# Patient Record
Sex: FEMALE | Race: OTHER | HISPANIC OR LATINO | Employment: OTHER | URBAN - METROPOLITAN AREA
[De-identification: names, ages, dates, MRNs, and addresses within clinical notes are randomized per-mention and may not be internally consistent; named-entity substitution may affect disease eponyms.]

---

## 2019-12-16 ENCOUNTER — APPOINTMENT (EMERGENCY)
Dept: RADIOLOGY | Facility: HOSPITAL | Age: 74
DRG: 872 | End: 2019-12-16
Payer: MEDICARE

## 2019-12-16 ENCOUNTER — HOSPITAL ENCOUNTER (INPATIENT)
Facility: HOSPITAL | Age: 74
LOS: 2 days | Discharge: HOME | DRG: 872 | End: 2019-12-18
Attending: EMERGENCY MEDICINE | Admitting: STUDENT IN AN ORGANIZED HEALTH CARE EDUCATION/TRAINING PROGRAM
Payer: MEDICARE

## 2019-12-16 ENCOUNTER — APPOINTMENT (EMERGENCY)
Dept: CT IMAGING | Facility: HOSPITAL | Age: 74
DRG: 872 | End: 2019-12-16
Payer: MEDICARE

## 2019-12-16 DIAGNOSIS — N10 ACUTE PYELONEPHRITIS: Primary | ICD-10-CM

## 2019-12-16 DIAGNOSIS — R10.84 GENERALIZED ABDOMINAL PAIN: ICD-10-CM

## 2019-12-16 DIAGNOSIS — N12 PYELONEPHRITIS: ICD-10-CM

## 2019-12-16 PROBLEM — E87.6 HYPOKALEMIA: Status: ACTIVE | Noted: 2019-12-16

## 2019-12-16 PROBLEM — R19.7 NAUSEA, VOMITING AND DIARRHEA: Status: ACTIVE | Noted: 2019-12-16

## 2019-12-16 PROBLEM — E83.42 HYPOMAGNESEMIA: Status: ACTIVE | Noted: 2019-12-16

## 2019-12-16 PROBLEM — R73.9 HYPERGLYCEMIA: Status: ACTIVE | Noted: 2019-12-16

## 2019-12-16 PROBLEM — R11.2 NAUSEA, VOMITING AND DIARRHEA: Status: ACTIVE | Noted: 2019-12-16

## 2019-12-16 PROBLEM — E78.5 HYPERLIPIDEMIA: Status: ACTIVE | Noted: 2019-12-16

## 2019-12-16 LAB
ALBUMIN SERPL BCP-MCNC: 2.9 G/DL (ref 3.5–5)
ALP SERPL-CCNC: 90 U/L (ref 46–116)
ALT SERPL W P-5'-P-CCNC: 41 U/L (ref 12–78)
ANION GAP SERPL CALCULATED.3IONS-SCNC: 11 MMOL/L (ref 4–13)
ANION GAP SERPL CALCULATED.3IONS-SCNC: 13 MMOL/L (ref 4–13)
AST SERPL W P-5'-P-CCNC: 63 U/L (ref 5–45)
ATRIAL RATE: 87 BPM
BACTERIA UR QL AUTO: ABNORMAL /HPF
BASOPHILS # BLD AUTO: 0.01 THOUSANDS/ΜL (ref 0–0.1)
BASOPHILS NFR BLD AUTO: 0 % (ref 0–1)
BILIRUB SERPL-MCNC: 0.8 MG/DL (ref 0.2–1)
BILIRUB UR QL STRIP: NEGATIVE
BUN SERPL-MCNC: 15 MG/DL (ref 5–25)
BUN SERPL-MCNC: 21 MG/DL (ref 5–25)
CALCIUM SERPL-MCNC: 7.3 MG/DL (ref 8.3–10.1)
CALCIUM SERPL-MCNC: 7.7 MG/DL (ref 8.3–10.1)
CHLORIDE SERPL-SCNC: 109 MMOL/L (ref 100–108)
CHLORIDE SERPL-SCNC: 99 MMOL/L (ref 100–108)
CLARITY UR: ABNORMAL
CO2 SERPL-SCNC: 22 MMOL/L (ref 21–32)
CO2 SERPL-SCNC: 22 MMOL/L (ref 21–32)
COLOR UR: YELLOW
CREAT SERPL-MCNC: 1.03 MG/DL (ref 0.6–1.3)
CREAT SERPL-MCNC: 1.12 MG/DL (ref 0.6–1.3)
EOSINOPHIL # BLD AUTO: 0 THOUSAND/ΜL (ref 0–0.61)
EOSINOPHIL NFR BLD AUTO: 0 % (ref 0–6)
ERYTHROCYTE [DISTWIDTH] IN BLOOD BY AUTOMATED COUNT: 13.7 % (ref 11.6–15.1)
EST. AVERAGE GLUCOSE BLD GHB EST-MCNC: 128 MG/DL
FLUAV RNA NPH QL NAA+PROBE: NORMAL
FLUBV RNA NPH QL NAA+PROBE: NORMAL
GFR SERPL CREATININE-BSD FRML MDRD: 48 ML/MIN/1.73SQ M
GFR SERPL CREATININE-BSD FRML MDRD: 53 ML/MIN/1.73SQ M
GLUCOSE P FAST SERPL-MCNC: 182 MG/DL (ref 65–99)
GLUCOSE SERPL-MCNC: 131 MG/DL (ref 65–140)
GLUCOSE SERPL-MCNC: 182 MG/DL (ref 65–140)
GLUCOSE SERPL-MCNC: 196 MG/DL (ref 65–140)
GLUCOSE UR STRIP-MCNC: NEGATIVE MG/DL
HBA1C MFR BLD: 6.1 % (ref 4.2–6.3)
HCT VFR BLD AUTO: 35.7 % (ref 34.8–46.1)
HGB BLD-MCNC: 11.9 G/DL (ref 11.5–15.4)
HGB UR QL STRIP.AUTO: ABNORMAL
IMM GRANULOCYTES # BLD AUTO: 0.03 THOUSAND/UL (ref 0–0.2)
IMM GRANULOCYTES NFR BLD AUTO: 0 % (ref 0–2)
KETONES UR STRIP-MCNC: ABNORMAL MG/DL
LACTATE SERPL-SCNC: 1.6 MMOL/L (ref 0.5–2)
LEUKOCYTE ESTERASE UR QL STRIP: ABNORMAL
LIPASE SERPL-CCNC: 92 U/L (ref 73–393)
LYMPHOCYTES # BLD AUTO: 0.42 THOUSANDS/ΜL (ref 0.6–4.47)
LYMPHOCYTES NFR BLD AUTO: 5 % (ref 14–44)
MAGNESIUM SERPL-MCNC: 1.6 MG/DL (ref 1.6–2.6)
MAGNESIUM SERPL-MCNC: 3.4 MG/DL (ref 1.6–2.6)
MCH RBC QN AUTO: 31.2 PG (ref 26.8–34.3)
MCHC RBC AUTO-ENTMCNC: 33.3 G/DL (ref 31.4–37.4)
MCV RBC AUTO: 94 FL (ref 82–98)
MONOCYTES # BLD AUTO: 0.26 THOUSAND/ΜL (ref 0.17–1.22)
MONOCYTES NFR BLD AUTO: 3 % (ref 4–12)
NEUTROPHILS # BLD AUTO: 7.33 THOUSANDS/ΜL (ref 1.85–7.62)
NEUTS SEG NFR BLD AUTO: 92 % (ref 43–75)
NITRITE UR QL STRIP: POSITIVE
NON-SQ EPI CELLS URNS QL MICRO: ABNORMAL /HPF
NRBC BLD AUTO-RTO: 0 /100 WBCS
P AXIS: 35 DEGREES
PH UR STRIP.AUTO: 5.5 [PH]
PLATELET # BLD AUTO: 131 THOUSANDS/UL (ref 149–390)
PLATELET # BLD AUTO: 132 THOUSANDS/UL (ref 149–390)
PMV BLD AUTO: 11.5 FL (ref 8.9–12.7)
PMV BLD AUTO: 11.6 FL (ref 8.9–12.7)
POTASSIUM SERPL-SCNC: 2.6 MMOL/L (ref 3.5–5.3)
POTASSIUM SERPL-SCNC: 4 MMOL/L (ref 3.5–5.3)
PR INTERVAL: 144 MS
PROT SERPL-MCNC: 7 G/DL (ref 6.4–8.2)
PROT UR STRIP-MCNC: ABNORMAL MG/DL
QRS AXIS: -22 DEGREES
QRSD INTERVAL: 98 MS
QT INTERVAL: 382 MS
QTC INTERVAL: 459 MS
RBC # BLD AUTO: 3.81 MILLION/UL (ref 3.81–5.12)
RBC #/AREA URNS AUTO: ABNORMAL /HPF
RSV RNA NPH QL NAA+PROBE: NORMAL
SODIUM SERPL-SCNC: 134 MMOL/L (ref 136–145)
SODIUM SERPL-SCNC: 142 MMOL/L (ref 136–145)
SP GR UR STRIP.AUTO: 1.01 (ref 1–1.03)
T WAVE AXIS: 35 DEGREES
UROBILINOGEN UR QL STRIP.AUTO: 0.2 E.U./DL
VENTRICULAR RATE: 87 BPM
WBC # BLD AUTO: 8.05 THOUSAND/UL (ref 4.31–10.16)
WBC #/AREA URNS AUTO: ABNORMAL /HPF
WBC CLUMPS # UR AUTO: PRESENT /UL

## 2019-12-16 PROCEDURE — 36415 COLL VENOUS BLD VENIPUNCTURE: CPT | Performed by: EMERGENCY MEDICINE

## 2019-12-16 PROCEDURE — 74176 CT ABD & PELVIS W/O CONTRAST: CPT

## 2019-12-16 PROCEDURE — 87086 URINE CULTURE/COLONY COUNT: CPT | Performed by: PHYSICIAN ASSISTANT

## 2019-12-16 PROCEDURE — 87186 SC STD MICRODIL/AGAR DIL: CPT | Performed by: PHYSICIAN ASSISTANT

## 2019-12-16 PROCEDURE — 96365 THER/PROPH/DIAG IV INF INIT: CPT

## 2019-12-16 PROCEDURE — 82948 REAGENT STRIP/BLOOD GLUCOSE: CPT

## 2019-12-16 PROCEDURE — 93005 ELECTROCARDIOGRAM TRACING: CPT

## 2019-12-16 PROCEDURE — 87077 CULTURE AEROBIC IDENTIFY: CPT | Performed by: PHYSICIAN ASSISTANT

## 2019-12-16 PROCEDURE — 87631 RESP VIRUS 3-5 TARGETS: CPT | Performed by: PHYSICIAN ASSISTANT

## 2019-12-16 PROCEDURE — 87077 CULTURE AEROBIC IDENTIFY: CPT | Performed by: EMERGENCY MEDICINE

## 2019-12-16 PROCEDURE — 96361 HYDRATE IV INFUSION ADD-ON: CPT

## 2019-12-16 PROCEDURE — 99220 PR INITIAL OBSERVATION CARE/DAY 70 MINUTES: CPT | Performed by: INTERNAL MEDICINE

## 2019-12-16 PROCEDURE — 85049 AUTOMATED PLATELET COUNT: CPT | Performed by: INTERNAL MEDICINE

## 2019-12-16 PROCEDURE — 93010 ELECTROCARDIOGRAM REPORT: CPT | Performed by: INTERNAL MEDICINE

## 2019-12-16 PROCEDURE — 71046 X-RAY EXAM CHEST 2 VIEWS: CPT

## 2019-12-16 PROCEDURE — 83690 ASSAY OF LIPASE: CPT | Performed by: PHYSICIAN ASSISTANT

## 2019-12-16 PROCEDURE — 84145 PROCALCITONIN (PCT): CPT | Performed by: PHYSICIAN ASSISTANT

## 2019-12-16 PROCEDURE — 85025 COMPLETE CBC W/AUTO DIFF WBC: CPT | Performed by: EMERGENCY MEDICINE

## 2019-12-16 PROCEDURE — 87186 SC STD MICRODIL/AGAR DIL: CPT | Performed by: EMERGENCY MEDICINE

## 2019-12-16 PROCEDURE — 80048 BASIC METABOLIC PNL TOTAL CA: CPT | Performed by: INTERNAL MEDICINE

## 2019-12-16 PROCEDURE — 81001 URINALYSIS AUTO W/SCOPE: CPT | Performed by: PHYSICIAN ASSISTANT

## 2019-12-16 PROCEDURE — 99285 EMERGENCY DEPT VISIT HI MDM: CPT

## 2019-12-16 PROCEDURE — 87040 BLOOD CULTURE FOR BACTERIA: CPT | Performed by: EMERGENCY MEDICINE

## 2019-12-16 PROCEDURE — 83735 ASSAY OF MAGNESIUM: CPT | Performed by: INTERNAL MEDICINE

## 2019-12-16 PROCEDURE — 83735 ASSAY OF MAGNESIUM: CPT | Performed by: PHYSICIAN ASSISTANT

## 2019-12-16 PROCEDURE — 83605 ASSAY OF LACTIC ACID: CPT | Performed by: EMERGENCY MEDICINE

## 2019-12-16 PROCEDURE — 83036 HEMOGLOBIN GLYCOSYLATED A1C: CPT | Performed by: INTERNAL MEDICINE

## 2019-12-16 PROCEDURE — 96375 TX/PRO/DX INJ NEW DRUG ADDON: CPT

## 2019-12-16 PROCEDURE — 99285 EMERGENCY DEPT VISIT HI MDM: CPT | Performed by: PHYSICIAN ASSISTANT

## 2019-12-16 PROCEDURE — 80053 COMPREHEN METABOLIC PANEL: CPT | Performed by: EMERGENCY MEDICINE

## 2019-12-16 RX ORDER — POTASSIUM CHLORIDE 14.9 MG/ML
20 INJECTION INTRAVENOUS
Status: COMPLETED | OUTPATIENT
Start: 2019-12-16 | End: 2019-12-16

## 2019-12-16 RX ORDER — POTASSIUM CHLORIDE 29.8 MG/ML
40 INJECTION INTRAVENOUS ONCE
Status: DISCONTINUED | OUTPATIENT
Start: 2019-12-16 | End: 2019-12-16 | Stop reason: SDUPTHER

## 2019-12-16 RX ORDER — MAGNESIUM SULFATE HEPTAHYDRATE 40 MG/ML
4 INJECTION, SOLUTION INTRAVENOUS ONCE
Status: COMPLETED | OUTPATIENT
Start: 2019-12-16 | End: 2019-12-16

## 2019-12-16 RX ORDER — HEPARIN SODIUM 5000 [USP'U]/ML
5000 INJECTION, SOLUTION INTRAVENOUS; SUBCUTANEOUS EVERY 8 HOURS SCHEDULED
Status: DISCONTINUED | OUTPATIENT
Start: 2019-12-16 | End: 2019-12-18 | Stop reason: HOSPADM

## 2019-12-16 RX ORDER — ROSUVASTATIN CALCIUM 10 MG/1
10 TABLET, COATED ORAL DAILY
COMMUNITY
End: 2021-02-20 | Stop reason: HOSPADM

## 2019-12-16 RX ORDER — IBUPROFEN 400 MG/1
400 TABLET ORAL ONCE
Status: DISCONTINUED | OUTPATIENT
Start: 2019-12-16 | End: 2019-12-16

## 2019-12-16 RX ORDER — POTASSIUM CHLORIDE 20 MEQ/1
40 TABLET, EXTENDED RELEASE ORAL ONCE
Status: COMPLETED | OUTPATIENT
Start: 2019-12-16 | End: 2019-12-16

## 2019-12-16 RX ORDER — ONDANSETRON 2 MG/ML
4 INJECTION INTRAMUSCULAR; INTRAVENOUS EVERY 6 HOURS PRN
Status: DISCONTINUED | OUTPATIENT
Start: 2019-12-16 | End: 2019-12-18 | Stop reason: HOSPADM

## 2019-12-16 RX ORDER — ONDANSETRON 2 MG/ML
4 INJECTION INTRAMUSCULAR; INTRAVENOUS ONCE
Status: COMPLETED | OUTPATIENT
Start: 2019-12-16 | End: 2019-12-16

## 2019-12-16 RX ORDER — ACETAMINOPHEN 325 MG/1
650 TABLET ORAL ONCE
Status: COMPLETED | OUTPATIENT
Start: 2019-12-16 | End: 2019-12-16

## 2019-12-16 RX ORDER — DICYCLOMINE HCL 20 MG
20 TABLET ORAL ONCE
Status: COMPLETED | OUTPATIENT
Start: 2019-12-16 | End: 2019-12-16

## 2019-12-16 RX ADMIN — SODIUM CHLORIDE 1000 ML: 0.9 INJECTION, SOLUTION INTRAVENOUS at 04:49

## 2019-12-16 RX ADMIN — DICYCLOMINE HYDROCHLORIDE 20 MG: 20 TABLET ORAL at 04:09

## 2019-12-16 RX ADMIN — CEFTRIAXONE SODIUM 1000 MG: 10 INJECTION, POWDER, FOR SOLUTION INTRAVENOUS at 04:42

## 2019-12-16 RX ADMIN — MAGNESIUM SULFATE HEPTAHYDRATE 4 G: 40 INJECTION, SOLUTION INTRAVENOUS at 05:31

## 2019-12-16 RX ADMIN — HEPARIN SODIUM 5000 UNITS: 5000 INJECTION INTRAVENOUS; SUBCUTANEOUS at 06:08

## 2019-12-16 RX ADMIN — POTASSIUM CHLORIDE 40 MEQ: 1500 TABLET, EXTENDED RELEASE ORAL at 04:10

## 2019-12-16 RX ADMIN — ONDANSETRON 4 MG: 2 INJECTION INTRAMUSCULAR; INTRAVENOUS at 04:10

## 2019-12-16 RX ADMIN — CEFEPIME HYDROCHLORIDE 2000 MG: 2 INJECTION, POWDER, FOR SOLUTION INTRAVENOUS at 20:18

## 2019-12-16 RX ADMIN — SODIUM CHLORIDE 1000 ML: 0.9 INJECTION, SOLUTION INTRAVENOUS at 03:39

## 2019-12-16 RX ADMIN — POTASSIUM CHLORIDE 20 MEQ: 200 INJECTION, SOLUTION INTRAVENOUS at 04:10

## 2019-12-16 RX ADMIN — ACETAMINOPHEN 650 MG: 325 TABLET, FILM COATED ORAL at 02:47

## 2019-12-16 RX ADMIN — POTASSIUM CHLORIDE 20 MEQ: 200 INJECTION, SOLUTION INTRAVENOUS at 06:01

## 2019-12-16 RX ADMIN — HEPARIN SODIUM 5000 UNITS: 5000 INJECTION INTRAVENOUS; SUBCUTANEOUS at 21:47

## 2019-12-16 NOTE — ED NOTES
Pt is asleep at this time   Menu given to family, should Pt wake up and want breakfast       Johan Rizo  12/16/19 [id number]

## 2019-12-16 NOTE — ASSESSMENT & PLAN NOTE
· Magnesium 1 6 on admission and to receive 4g IV magnesium  · Will recheck magnesium level this a m  at 1000H

## 2019-12-16 NOTE — ASSESSMENT & PLAN NOTE
· Elevated on admission; patient denies history of diabetes  · Possibly related to infection but will screen for diabetes with A1c

## 2019-12-16 NOTE — PROGRESS NOTES
Patient seen for post admission follow-up, admitted with urinary tract infection and evidence of left pyelonephritis  Patient has history of right nephrectomy about 28 years ago, reason is not entirely clear but her daughter thinks it was for some sort of severe infection  Patient was initially brought in for observation, she will require > 2 midnights and has been transition to inpatient status  She has pyelonephritis and met sepsis criteria on admission, requires IV antibiotics and fluids and serial monitoring  A/P:   1  Pyelonephritis - continue ceftriaxone, IV fluids, monitor BMP  Follow up on urine culture  2  Nausea/vomiting/diarrhea - possibly due to the pyelonephritis, cannot entirely rule out enteritis, CT showing liquid stool in the colon  3  Hypokalemia/hypomagnesemia - secondary to GI losses, replaced  Recheck BMP in a m   4  Hyperglycemia - likely reactive, A1c pending

## 2019-12-16 NOTE — ED NOTES
1  Chief complaint- abd pain/vomiting  2  Is this admission due to an injury? no  3  Orientation status- A/O x4  4  Abnormal labs, tests, vitals N/A  5  Important drips N/A  6  Time of last narcotics N/A  7  IV lines, drains, tubes 20 R AC  8  Isolation status- N/A  9  Skin check unremarkable  10  Ambulation status ambulates to self  Pt is Sudanese speaking only  6  ED RN phone number [phone] [facility] Wallace Aponte RN  14  12/16/19 [id number]  15        Shorty Tyler RN  12/16/19 0232

## 2019-12-16 NOTE — ASSESSMENT & PLAN NOTE
· Presenting with fever, abdominal pain, nausea/vomiting/diarrhea  · UA strongly suggestive of UTI and left kidney with evidence of pyelonephritis from CT abdomen/pelvis; of note is surgically absent right kidney  · Fortunately patient is hemodynamically stable and nontoxic appearing at time of my evaluation  · Received ceftriaxone in ED, will continue pending results of urine culture and blood cultures x2  · Trend WBC, temperature curve, hemodynamics  · Additionally will need to closely monitor renal function in setting of solitary kidney

## 2019-12-16 NOTE — ED NOTES
Pt placed on a bedpan and given call bell  Pt breakfast is at bedside        [facility]  12/16/19 [id number]

## 2019-12-16 NOTE — ASSESSMENT & PLAN NOTE
· K 2 6 on admission, likely in setting of GI losses      · Received 40 mEq p o  K and is currently receiving 2nd of 20 mg IV K  · EKG is pending, no arrhythmias noted on telemetry review  · Telemetry monitoring until K > 3  · Repeat BMP at 1000H

## 2019-12-16 NOTE — ASSESSMENT & PLAN NOTE
· Patient and family stating she is on rosuvastatin but unable to provide dose at this time  · Will initiate statin once family able to provide dose

## 2019-12-16 NOTE — H&P
H&P- [place] 1945, 66 y o  female MRN: [id number]    Unit/Bed#: ED 21 Encounter: [id number]    Primary Care Provider: No primary care provider on file  Date and time admitted to hospital: 12/16/2019  2:10 AM        * Pyelonephritis  Assessment & Plan  · Presenting with fever, abdominal pain, nausea/vomiting/diarrhea  · UA strongly suggestive of UTI and left kidney with evidence of pyelonephritis from CT abdomen/pelvis; of note is surgically absent right kidney  · Fortunately patient is hemodynamically stable and nontoxic appearing at time of my evaluation  · Received ceftriaxone in ED, will continue pending results of urine culture and blood cultures x2  · Trend WBC, temperature curve, hemodynamics  · Additionally will need to closely monitor renal function in setting of solitary kidney    Nausea, vomiting and diarrhea  Assessment & Plan  · Likely in setting of pyelonephritis, however cannot exclude possible concomitant viral gastroenteritis as patient with multiple recent sick contacts  · Patient family deny any recent hospitalizations or broad-spectrum antibiotic use  · Supportive care at this time  · P r n  Zofran as antiemetic    Hypokalemia  Assessment & Plan  · K 2 6 on admission, likely in setting of GI losses      · Received 40 mEq p o  K and is currently receiving 2nd of 20 mg IV K  · EKG normal sinus rhythm, no arrhythmias noted on telemetry review  · Telemetry monitoring until K > 3  · Repeat BMP at 1000H    Hypomagnesemia  Assessment & Plan  · Magnesium 1 6 on admission and to receive 4g IV magnesium  · Will recheck magnesium level this a m  at 1000H    Hyperlipidemia  Assessment & Plan  · Patient and family stating she is on rosuvastatin but unable to provide dose at this time  · Will initiate statin once family able to provide dose    Hyperglycemia  Assessment & Plan  · Elevated on admission; patient denies history of diabetes  · Possibly related to infection but will screen for diabetes with A1c      VTE Prophylaxis: Heparin  / sequential compression device   Code Status: Full code  POLST: POLST form is not discussed and not completed at this time  Anticipated Length of Stay:  Patient will be admitted on an Observation basis with an anticipated length of stay of  less than 2 midnights  Justification for Hospital Stay: Please see detailed plans noted above  Chief Complaint:     Nausea, vomiting, diarrhea  History of Present Illness:  Bill Bruno is a 66 y o  female who has a past medical history significant for hyperlipidemia and s/p right total nephrectomy approximately 20 years ago at [facility] apparently in setting of prior infection  Presents with a 1 day history of abdominal pain, nausea, vomiting, and diarrhea  These began suddenly the morning 12/15/2019 described as a cramping pain in the mid abdomen without radiation  These were associated with subsequent decreased appetite and poor oral intake  Both patient and daughter) was present at bedside) endorse recent sick contacts of daughter and grandchildren apparently with gastroenteritis  This evening, patient endorsed fever and chills, which prompted patient to seek medical attention  She denied any shortness of breath, cough/wheezing, dysuria, urgency/frequency, or new rashes/lesions  Found during ED evaluation to have significant hypokalemia with K 2 6 with Mg 1 6 for which patient is currently undergoing repletion, and UA with pyuria and bacteriuria suspicious for UTI; a subsequent CT abdomen/pelvis revealed evidence of left kidney pyelonephritis for which patient was started on ceftriaxone and patient is subsequently admitted for further evaluation/treatment of above  At the time of my evaluation, patient is lying in bed in no acute distress  She continues to endorse some abdominal pain somewhat worse in the LLQ    She reports resolution of her fever, and additionally endorses improvement in her nausea and diarrhea  Review of Systems:    Constitutional:  Endorses fever, chills, fatigue, and decreased appetite  Eyes:  Denies change in visual acuity   HENT:  Denies nasal congestion or sore throat   Respiratory:  Denies cough or shortness of breath   Cardiovascular:  Denies chest pain or edema   GI:  Endorses abdominal pain, nausea, vomiting, and diarrhea   :  Denies dysuria   Musculoskeletal:  Denies back pain or joint pain   Integument:  Denies rash   Neurologic:  Denies headache, focal weakness or sensory changes   Endocrine:  Denies polyuria or polydipsia   Lymphatic:  Denies swollen glands   Psychiatric:  Denies depression or anxiety     Past Medical and Surgical History:   Past Medical History:   Diagnosis Date    Hyperlipidemia      Past Surgical History:   Procedure Laterality Date    KIDNEY SURGERY      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 ago        Meds/Allergies:    Current Facility-Administered Medications:     magnesium sulfate 4 g/100 mL IVPB (premix) 4 g, 4 g, Intravenous, Once, [employer], PA-C    potassium chloride 20 mEq IVPB (premix), 20 mEq, Intravenous, Q2H, [employer], PA-C, Last Rate: 50 mL/hr at 12/16/19 0410, 20 mEq at 12/16/19 0410    sodium chloride 0 9 % bolus 1,000 mL, 1,000 mL, Intravenous, Once, [employer], PA-C, Last Rate: 1,000 mL/hr at 12/16/19 0449, 1,000 mL at 12/16/19 0449  No current outpatient medications on file  Allergies:    Allergies   Allergen Reactions    Iodine Other (See Comments)     fainting     History:  Marital Status: /Civil Union     Substance Use History:   Social History     Substance and Sexual Activity   Alcohol Use Never    Frequency: Never     Social History     Tobacco Use   Smoking Status Never Smoker   Smokeless Tobacco Never Used     Social History     Substance and Sexual Activity   Drug Use Never       Family History:  Non-contributory    Physical Exam:     Vitals:   Blood Pressure: 97/54 (12/16/19 0430)  Pulse: 75 (12/16/19 0430)  Temperature: 99 1 °F (37 3 °C) (12/16/19 0411)  Temp Source: Oral (12/16/19 0411)  Respirations: 22 (12/16/19 0430)  Weight - Scale: 65 3 kg (143 lb 15 4 oz) (12/16/19 0221)  SpO2: 95 % (12/16/19 0430)    Constitutional:  Well developed, well nourished, no acute distress, non-toxic appearance   Eyes:  PERRL, conjunctiva normal   HENT:  Atraumatic, external ears normal, nose normal, oropharynx moist, no pharyngeal exudates  Neck- normal range of motion, no tenderness, supple   Respiratory:  No respiratory distress, normal breath sounds, no rales, no wheezing   Cardiovascular:  Normal rate, normal rhythm, no murmurs, no gallops, no rubs   GI:  Soft, nondistended, normal bowel sounds, mild tenderness to deep palpation greatest in the LLQ, no organomegaly, no mass, no rebound, no guarding   :  Mild left costovertebral angle tenderness   Musculoskeletal:  No edema, no tenderness, no deformities  Back- no tenderness  Integument:  Well hydrated, no rash   Lymphatic:  No lymphadenopathy noted   Neurologic:  Alert &awake, communicative, CN 2-12 normal, normal motor function, normal sensory function, no focal deficits noted   Psychiatric:  Speech and behavior appropriate       Lab Results: I have personally reviewed pertinent reports  Results from last 7 days   Lab Units 12/16/19 0229   WBC Thousand/uL 8 05   HEMOGLOBIN g/dL 11 9   HEMATOCRIT % 35 7   PLATELETS Thousands/uL 132*   NEUTROS PCT % 92*   LYMPHS PCT % 5*   MONOS PCT % 3*   EOS PCT % 0     Results from last 7 days   Lab Units 12/16/19 0229   POTASSIUM mmol/L 2 6*   CHLORIDE mmol/L 99*   CO2 mmol/L 22   BUN mg/dL 21   CREATININE mg/dL 1 12   CALCIUM mg/dL 7 7*   ALK PHOS U/L 90   ALT U/L 41   AST U/L 63*           EKG:  Normal sinus rhythm    Imaging: I have personally reviewed pertinent reports        Ct Abdomen Pelvis Wo Contrast    Result Date: 12/16/2019  Narrative: CT ABDOMEN AND PELVIS WITHOUT IV CONTRAST INDICATION:   Nausea/vomiting Epigastric pain  COMPARISON:  None  TECHNIQUE:  CT examination of the abdomen and pelvis was performed without intravenous contrast   Axial, sagittal, and coronal 2D reformatted images were created from the source data and submitted for interpretation  Radiation dose length product (DLP) for this visit:  473 mGy-cm   This examination, like all CT scans performed in the [facility], was performed utilizing techniques to minimize radiation dose exposure, including the use of iterative reconstruction and automated exposure control  Enteric contrast was not administered  FINDINGS: ABDOMEN Evaluation of intra-abdominal organs is limited to lack of IV contrast  Evaluation of the bowel is limited to lack of enteric contrast  LOWER CHEST:  No clinically significant abnormality identified in the visualized lower chest  LIVER/BILIARY TREE:  Unremarkable  GALLBLADDER:  No calcified gallstones  No pericholecystic inflammatory change  SPLEEN:  Unremarkable  PANCREAS:  Unremarkable  ADRENAL GLANDS:  Unremarkable  KIDNEYS/URETERS:  The right kidney is absent  The left kidney demonstrates moderate hydronephrosis and perinephric stranding  No definite obstructive calculus  STOMACH AND BOWEL:  No bowel obstruction  Diverticulosis without evidence of diverticulitis  Liquid stool in the colon may be due to diarrheal illness  APPENDIX:  A normal appendix was visualized  ABDOMINOPELVIC CAVITY:  No ascites or free intraperitoneal air  No lymphadenopathy  VESSELS:  Minimal atherosclerotic calcifications  PELVIS REPRODUCTIVE ORGANS:  Unremarkable for patient's age  URINARY BLADDER:  Unremarkable  ABDOMINAL WALL/INGUINAL REGIONS:  Unremarkable  OSSEOUS STRUCTURES:  No acute fracture or destructive osseous lesion  Degenerative changes the osseous structures  Back and disc phenomenon at L4-L5  Mild chronic superior endplate depression at P01  Impression: 1  The right kidney is absent    The left kidney demonstrates moderate hydronephrosis and perinephric stranding  No definite obstructive calculus is seen  Findings may be due to recently passed calculus versus pyelonephritis, correlate with urinalysis  2   Diverticulosis without evidence of diverticulitis  Liquid stool in the colon may be due to diarrheal illness  Workstation performed: [id number]         ** Please Note: Dragon 360 Dictation voice to text software was used in the creation of this document   **

## 2019-12-16 NOTE — ED PROVIDER NOTES
History  Chief Complaint   Patient presents with    Diarrhea     Patient brought in by EMS for diarrhea, nausea and vomiting that started last 24 hours   Vomiting     V JOSH  is a 67 y/o female with PMH significant for hyperlipidemia and kidney removal who presents to the emergency department via EMS with daughter at bedside for complaint of nausea, vomiting, and abdominal pain since Sunday  Patient is Kiswahili-speaking, translation provided by daughter at bedside  Patient lives at home with daughter  Per daughter, decreased appetite and solid food intake since Sunday, however has been maintaining oral intake of liquids  Daughter also admits to fever without OTC ibuprofen or tylenol use  Patient admits to cramping type abdominal pain, localizes pain to mid abdomen and epigastrium, admits to pain radiation to bilateral mid back, not made better or worse by anything in particular, accompanied by nausea, vomiting, diarrhea described as melena and foul smelling, abdominal distension, and increased belching, weakness, fatigue, myalgias, shaking chills, episodic fever, pain constant since start, not improved with aspirin and Pepto-Bismol  No recent antibiotic use or history of c diff  Patient denies lightheadedness/dizziness, headache, congestion, syncope, chest pain, numbness, hematochezia, hematemesis, hx of GI bleed, esophagitis, GI ulcer, sick contacts, recent travel, trauma, urinary symptoms  Patient admits to shortness of breath accompanying nausea and vomiting episodes, however denies shortness of breath currently while lying at rest           None       Past Medical History:   Diagnosis Date    Hyperlipidemia        Past Surgical History:   Procedure Laterality Date    KIDNEY SURGERY      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 ago        History reviewed  No pertinent family history  I have reviewed and agree with the history as documented      Social History     Tobacco Use    Smoking status: Never Smoker    Smokeless tobacco: Never Used   Substance Use Topics    Alcohol use: Never     Frequency: Never    Drug use: Never        Review of Systems   Constitutional: Positive for activity change, appetite change, chills, fatigue and fever  Negative for diaphoresis and unexpected weight change  HENT: Negative for congestion, ear discharge, ear pain, facial swelling, mouth sores, postnasal drip, rhinorrhea, sinus pressure, sinus pain, sore throat, trouble swallowing and voice change  Eyes: Negative for photophobia, discharge, redness and visual disturbance  Respiratory: Positive for shortness of breath  Negative for cough, choking, chest tightness, wheezing and stridor  Cardiovascular: Negative for chest pain, palpitations and leg swelling  Gastrointestinal: Positive for abdominal distention, abdominal pain, diarrhea, nausea and vomiting  Negative for blood in stool and constipation  Genitourinary: Negative for decreased urine volume, difficulty urinating, dysuria, flank pain, frequency, hematuria, pelvic pain, urgency, vaginal bleeding and vaginal discharge  Musculoskeletal: Positive for back pain  Negative for arthralgias, gait problem, joint swelling, myalgias, neck pain and neck stiffness  Skin: Negative for color change, pallor, rash and wound  Allergic/Immunologic: Negative for food allergies  Neurological: Positive for weakness  Negative for dizziness, tremors, seizures, syncope, facial asymmetry, speech difficulty, light-headedness, numbness and headaches  Hematological: Negative for adenopathy  Psychiatric/Behavioral: Negative for confusion and decreased concentration  All other systems reviewed and are negative  Physical Exam  Physical Exam   Constitutional: She is oriented to person, place, and time  She appears well-developed and well-nourished  She is cooperative  Non-toxic appearance  No distress  HENT:   Head: Normocephalic and atraumatic     Mouth/Throat: Oropharynx is clear and moist    Eyes: Pupils are equal, round, and reactive to light  Conjunctivae and EOM are normal    Neck: Normal range of motion  Neck supple  Cardiovascular: Normal rate, regular rhythm, S1 normal, S2 normal, normal heart sounds and intact distal pulses  Exam reveals no decreased pulses  No murmur heard  Pulmonary/Chest: Effort normal and breath sounds normal  No stridor  No tachypnea  No respiratory distress  She has no decreased breath sounds  She has no wheezes  She has no rhonchi  She exhibits no tenderness, no edema and no swelling  Abdominal: Soft  Normal appearance and normal aorta  She exhibits no distension, no ascites, no pulsatile midline mass and no mass  Bowel sounds are decreased  There is no hepatosplenomegaly  There is tenderness in the right upper quadrant, epigastric area, periumbilical area, suprapubic area, left upper quadrant and left lower quadrant  There is guarding  There is no rigidity, no rebound and no CVA tenderness  No hernia  Musculoskeletal: She exhibits no edema, tenderness or deformity  Neurological: She is alert and oriented to person, place, and time  She has normal strength  She displays no tremor  No cranial nerve deficit or sensory deficit  She displays a negative Romberg sign  She displays no seizure activity  GCS eye subscore is 4  GCS verbal subscore is 5  GCS motor subscore is 6  Skin: Skin is warm, dry and intact  Capillary refill takes less than 2 seconds  No abrasion, no bruising, no laceration, no lesion, no petechiae and no rash noted  No erythema  No pallor  Vitals reviewed        Vital Signs  ED Triage Vitals   Temperature Pulse Respirations Blood Pressure SpO2   12/16/19 0221 12/16/19 0215 12/16/19 0221 12/16/19 0215 12/16/19 0215   (!) 103 2 °F (39 6 °C) 89 18 121/60 94 %      Temp Source Heart Rate Source Patient Position - Orthostatic VS BP Location FiO2 (%)   12/16/19 0221 12/16/19 0221 12/16/19 0221 12/16/19 0221 --   Oral Monitor Lying Right arm       Pain Score       --                  Vitals:    12/16/19 0530 12/16/19 0600 12/16/19 0630 12/16/19 0700   BP: 96/51 97/55 94/51 92/69   Pulse: 68  67 66   Patient Position - Orthostatic VS:    Lying         Visual Acuity      ED Medications  Medications   potassium chloride 20 mEq IVPB (premix) (20 mEq Intravenous New Bag 12/16/19 0601)   magnesium sulfate 4 g/100 mL IVPB (premix) 4 g (4 g Intravenous New Bag 12/16/19 0531)   ondansetron (ZOFRAN) injection 4 mg (has no administration in time range)   ceftriaxone (ROCEPHIN) 1 g/50 mL in dextrose IVPB (has no administration in time range)   heparin (porcine) subcutaneous injection 5,000 Units (5,000 Units Subcutaneous Given 12/16/19 0608)   acetaminophen (TYLENOL) tablet 650 mg (650 mg Oral Given 12/16/19 0247)   sodium chloride 0 9 % bolus 1,000 mL (0 mL Intravenous Stopped 12/16/19 0440)   ondansetron (ZOFRAN) injection 4 mg (4 mg Intravenous Given 12/16/19 0410)   dicyclomine (BENTYL) tablet 20 mg (20 mg Oral Given 12/16/19 0409)   potassium chloride (K-DUR,KLOR-CON) CR tablet 40 mEq (40 mEq Oral Given 12/16/19 0410)   ceftriaxone (ROCEPHIN) 1 g/50 mL in dextrose IVPB (0 mg Intravenous Stopped 12/16/19 0539)   sodium chloride 0 9 % bolus 1,000 mL (0 mL Intravenous Stopped 12/16/19 0555)       Diagnostic Studies  Results Reviewed     Procedure Component Value Units Date/Time    Platelet count [[id number]]     Lab Status:  No result Specimen:  Blood     Influenza A/B and RSV PCR [[id number]]  (Normal) Collected:  12/16/19 0357    Lab Status:  Final result Specimen:  Nasopharyngeal Swab Updated:  12/16/19 0452     INFLUENZA A PCR None Detected     INFLUENZA B PCR None Detected     RSV PCR None Detected    Urine Microscopic [[id number]]  (Abnormal) Collected:  12/16/19 0411    Lab Status:  Final result Specimen:  Urine, Other Updated:  12/16/19 0432     RBC, UA 0-1 /hpf      WBC, UA 10-20 /hpf      Epithelial Cells Occasional /hpf      Bacteria, UA Innumerable /hpf      WBC Clumps PRESENT    Urine culture [[id number]] Collected:  12/16/19 0411    Lab Status:   In process Specimen:  Urine, Other Updated:  12/16/19 0432    UA w Reflex to Microscopic w Reflex to Culture [[id number]]  (Abnormal) Collected:  12/16/19 0411    Lab Status:  Final result Specimen:  Urine, Other Updated:  12/16/19 0423     Color, UA Yellow     Clarity, UA Slightly Cloudy     Specific [place], UA 1 010     pH, UA 5 5     Leukocytes, UA Large     Nitrite, UA Positive     Protein, UA 30 (1+) mg/dl      Glucose, UA Negative mg/dl      Ketones, UA 40 (2+) mg/dl      Urobilinogen, UA 0 2 E U /dl      Bilirubin, UA Negative     Blood, UA Large    Lipase [[id number]]  (Normal) Collected:  12/16/19 0357    Lab Status:  Final result Specimen:  Blood from Arm, Right Updated:  12/16/19 0417     Lipase 92 u/L     Magnesium [[id number]]  (Normal) Collected:  12/16/19 0357    Lab Status:  Final result Specimen:  Blood from Arm, Right Updated:  12/16/19 0417     Magnesium 1 6 mg/dL     Fingerstick Glucose (POCT) [[id number]]  (Normal) Collected:  12/16/19 0414    Lab Status:  Final result Updated:  12/16/19 0415     POC Glucose 131 mg/dl     Comprehensive metabolic panel [[id number]]  (Abnormal) Collected:  12/16/19 0229    Lab Status:  Final result Specimen:  Blood from Arm, Right Updated:  12/16/19 0311     Sodium 134 mmol/L      Potassium 2 6 mmol/L      Chloride 99 mmol/L      CO2 22 mmol/L      ANION GAP 13 mmol/L      BUN 21 mg/dL      Creatinine 1 12 mg/dL      Glucose 196 mg/dL      Calcium 7 7 mg/dL      AST 63 U/L      ALT 41 U/L      Alkaline Phosphatase 90 U/L      Total Protein 7 0 g/dL      Albumin 2 9 g/dL      Total Bilirubin 0 80 mg/dL      eGFR 48 ml/min/1 73sq m     Narrative:       [place] guidelines for Chronic Kidney Disease (CKD):     Stage 1 with normal or high GFR (GFR > 90 mL/min/1 73 square meters)    Stage 2 Mild CKD (GFR = 60-89 mL/min/1 73 square meters)    Stage 3A Moderate CKD (GFR = 45-59 mL/min/1 73 square meters)    Stage 3B Moderate CKD (GFR = 30-44 mL/min/1 73 square meters)    Stage 4 Severe CKD (GFR = 15-29 mL/min/1 73 square meters)    Stage 5 End Stage CKD (GFR <15 mL/min/1 73 square meters)  Note: GFR calculation is accurate only with a steady state creatinine    CBC and differential [[id number]]  (Abnormal) Collected:  12/16/19 0229    Lab Status:  Final result Specimen:  Blood from Arm, Right Updated:  12/16/19 0306     WBC 8 05 Thousand/uL      RBC 3 81 Million/uL      Hemoglobin 11 9 g/dL      Hematocrit 35 7 %      MCV 94 fL      MCH 31 2 pg      MCHC 33 3 g/dL      RDW 13 7 %      MPV 11 5 fL      Platelets 122 Thousands/uL      nRBC 0 /100 WBCs      Neutrophils Relative 92 %      Immat GRANS % 0 %      Lymphocytes Relative 5 %      Monocytes Relative 3 %      Eosinophils Relative 0 %      Basophils Relative 0 %      Neutrophils Absolute 7 33 Thousands/µL      Immature Grans Absolute 0 03 Thousand/uL      Lymphocytes Absolute 0 42 Thousands/µL      Monocytes Absolute 0 26 Thousand/µL      Eosinophils Absolute 0 00 Thousand/µL      Basophils Absolute 0 01 Thousands/µL     Lactic acid x2 [[id number]]  (Normal) Collected:  12/16/19 0229    Lab Status:  Final result Specimen:  Blood from Arm, Right Updated:  12/16/19 0256     LACTIC ACID 1 6 mmol/L     Narrative:       Result may be elevated if tourniquet was used during collection  Blood culture #2 [[id number]] Collected:  12/16/19 0229    Lab Status: In process Specimen:  Blood from Arm, Right Updated:  12/16/19 0240    Blood culture #1 [[id number]] Collected:  12/16/19 0230    Lab Status: In process Specimen:  Blood from Hand, Right Updated:  12/16/19 0235                 CT abdomen pelvis wo contrast   Final Result by Venkat Rubin MD (12/16 0404)      1  The right kidney is absent  The left kidney demonstrates moderate hydronephrosis and perinephric stranding    No definite obstructive calculus is seen  Findings may be due to recently passed calculus versus pyelonephritis, correlate with urinalysis  2   Diverticulosis without evidence of diverticulitis  Liquid stool in the colon may be due to diarrheal illness  Workstation performed: [id number]         XR chest 2 views    (Results Pending)              Procedures  Procedures         ED Course  ED Course as of Dec 16 0725   Mon Dec 16, 2019   4438 Critical potassium of 2 6, will replete with 40meq oral once and 40meq IV once  5970 CT shows evidence of perinephric stranding  UA shows evidence of UTI  Correlating these results, patient displays acute pyelonephritis and qualifies for SIRS criteria due to elevated temperature on ER presentation  Will start ceftriaxone 1 g IV  Patient is considered a complicated case of acute pyelonephritis due to age and absent kidney  Will discuss case with Dr Chang Carter for admission  0437 BP decreased  Second liter of fluid hung now  0451 Mag 1 6, will replete with 4meq at request of Dr Chang Carter  Patient accepted for obs  Initial Sepsis Screening     Row Name 12/16/19 0438                Is the patient's history suggestive of a new or worsening infection? (!) Yes (Proceed)  -JE        Suspected source of infection  urinary tract infection  -JE        Are two or more of the following signs & symptoms of infection both present and new to the patient? No  -JE        Indicate SIRS criteria  Hyperthemia > 38 3C (100 9F)  -JE        If the answer is yes to both questions, suspicion of sepsis is present  --        If severe sepsis is present AND tissue hypoperfusion perists in the hour after fluid resuscitation or lactate > 4, the patient meets criteria for SEPTIC SHOCK  --        Are any of the following organ dysfunction criteria present within 6 hours of suspected infection and SIRS criteria that are NOT considered to be chronic conditions?   No -JE        Organ dysfunction  --        Date of presentation of severe sepsis  --        Time of presentation of severe sepsis  --        Tissue hypoperfusion persists in the hour after crystalloid fluid administration, evidenced, by either:  --        Was hypotension present within one hour of the conclusion of crystalloid fluid administration?  --        Date of presentation of septic shock  --        Time of presentation of septic shock  --          User Key  (r) = Recorded By, (t) = Taken By, (c) = Cosigned By    [address] Name Provider Type    Franny Kyle PA-C Physician Assistant                  MDM  Number of Diagnoses or Management Options  Acute pyelonephritis:   Generalized abdominal pain:   Diagnosis management comments: On exam, well-appearing female, no apparent distress, febrile, otherwise stable, nontoxic appearance, AAOx3, abdominal TTP in the RUQ, epigastric and periumbilical region, LUQ, LLQ, and suprapubic area, with involuntary guarding, no peritoneal signs, no mass or hernia palpable, no decreased or adventitious lung sounds, exam otherwise unremarkable  Differential diagnosis includes but is not limited to:  esophagitis, GI ulcer, GERD, IBS, tenesmus, viral gastroenteritis, acute viral syndrome, UTI, pancreatitis, pericarditis, PE    Will obtain:  CBC to assess for anemia and leukocytosis  CMP to assess electrolytes, kidney and liver function, glucose level  Mag  Finger stick glucose  Lipase to assess for elevation suggesting acute pancreatitis  UA to assess for UTI  CT abd and pelvis without contrast, history of syncope with contrast  EKG to assess for ST or T changes  Blood cultures x 2, lactate x 2  Flu PCR    Will start fluids to hydrate, motrin and tylenol for fever and pain, bentyl for camping pains, zofran for nausea in ED         Amount and/or Complexity of Data Reviewed  Clinical lab tests: ordered and reviewed  Tests in the radiology section of CPT®: ordered and reviewed  Tests in the medicine section of CPT®: ordered and reviewed  Discussion of test results with the performing providers: yes  Decide to obtain previous medical records or to obtain history from someone other than the patient: yes  Obtain history from someone other than the patient: yes  Review and summarize past medical records: yes  Discuss the patient with other providers: yes  Independent visualization of images, tracings, or specimens: yes    Risk of Complications, Morbidity, and/or Mortality  General comments: CXR hazy but no definitive infiltrates  See ED course note for dispo and plan  I reviewed and discussed all lab and imaging findings with the patient and daughter at bedside  I answered any and all questions the patient and daughter had regarding emergency department course of evaluation and treatment  The patient verbalized understanding of and agreement with plan  Patient Progress  Patient progress: stable        Disposition  Final diagnoses:   Acute pyelonephritis   Generalized abdominal pain     Time reflects when diagnosis was documented in both MDM as applicable and the Disposition within this note     Time User Action Codes Description Comment    12/16/2019  4:50 AM Caitlin Baisn [N10] Acute pyelonephritis     12/16/2019  4:50 AM Caitlin Bains [R10 [id number]] Generalized abdominal pain       ED Disposition     ED Disposition Condition Date/Time Comment    Admit Stable Mon Dec 16, 2019  4:50 AM Case was discussed with Dr Figueroa Hall and the patient's admission status was agreed to be Admission Status: observation status to the service of Dr Figueroa Hall   Follow-up Information    None         Patient's Medications    No medications on file     No discharge procedures on file      ED Provider  Electronically Signed by           Wilda Garcia PA-C  12/16/19 9288

## 2019-12-16 NOTE — PLAN OF CARE
Problem: Potential for Falls  Goal: Patient will remain free of falls  Description  INTERVENTIONS:  - Assess patient frequently for physical needs  -  Identify cognitive and physical deficits and behaviors that affect risk of falls    -  [place] fall precautions as indicated by assessment   - Educate patient/family on patient safety including physical limitations  - Instruct patient to call for assistance with activity based on assessment  - Modify environment to reduce risk of injury  - Consider OT/PT consult to assist with strengthening/mobility  Outcome: Progressing     Problem: PAIN - ADULT  Goal: Verbalizes/displays adequate comfort level or baseline comfort level  Description  Interventions:  - Encourage patient to monitor pain and request assistance  - Assess pain using appropriate pain scale  - Administer analgesics based on type and severity of pain and evaluate response  - Implement non-pharmacological measures as appropriate and evaluate response  - Consider cultural and social influences on pain and pain management  - Notify physician/advanced practitioner if interventions unsuccessful or patient reports new pain  Outcome: Progressing     Problem: SAFETY ADULT  Goal: Maintain or return to baseline ADL function  Description  INTERVENTIONS:  -  Assess patient's ability to carry out ADLs; assess patient's baseline for ADL function and identify physical deficits which impact ability to perform ADLs (bathing, care of mouth/teeth, toileting, grooming, dressing, etc )  - Assess/evaluate cause of self-care deficits   - Assess range of motion  - Assess patient's mobility; develop plan if impaired  - Assess patient's need for assistive devices and provide as appropriate  - Encourage maximum independence but intervene and supervise when necessary  - Involve family in performance of ADLs  - Assess for home care needs following discharge   - Consider OT consult to assist with ADL evaluation and planning for discharge  - Provide patient education as appropriate  Outcome: Progressing  Goal: Maintain or return mobility status to optimal level  Description  INTERVENTIONS:  - Assess patient's baseline mobility status (ambulation, transfers, stairs, etc )    - Identify cognitive and physical deficits and behaviors that affect mobility  - Identify mobility aids required to assist with transfers and/or ambulation (gait belt, sit-to-stand, lift, walker, cane, etc )  - [place] fall precautions as indicated by assessment  - Record patient progress and toleration of activity level on Mobility SBAR; progress patient to next Phase/Stage  - Instruct patient to call for assistance with activity based on assessment  - Consider rehabilitation consult to assist with strengthening/weightbearing, etc   Outcome: Progressing     Problem: DISCHARGE PLANNING  Goal: Discharge to home or other facility with appropriate resources  Description  INTERVENTIONS:  - Identify barriers to discharge w/patient and caregiver  - Arrange for needed discharge resources and transportation as appropriate  - Identify discharge learning needs (meds, wound care, etc )  - Arrange for interpretive services to assist at discharge as needed  - Refer to Case Management Department for coordinating discharge planning if the patient needs post-hospital services based on physician/advanced practitioner order or complex needs related to functional status, cognitive ability, or social support system  Outcome: Progressing     Problem: Knowledge Deficit  Goal: Patient/family/caregiver demonstrates understanding of disease process, treatment plan, medications, and discharge instructions  Description  Complete learning assessment and assess knowledge base    Interventions:  - Provide teaching at level of understanding  - Provide teaching via preferred learning methods  Outcome: Progressing

## 2019-12-16 NOTE — ED NOTES
Slipper socks applied to patient  Pt ambulated to the bathroom, family member accompanied patient         Coby Armstrong RN  12/16/19 4454

## 2019-12-16 NOTE — ASSESSMENT & PLAN NOTE
· Likely in setting of pyelonephritis, however cannot exclude possible concomitant viral gastroenteritis as patient with multiple recent sick contacts  · Patient family deny any recent hospitalizations or broad-spectrum antibiotic use  · Supportive care at this time  · P r n   Zofran as antiemetic

## 2019-12-17 PROBLEM — A41.9 SEPSIS (HCC): Status: ACTIVE | Noted: 2019-12-17

## 2019-12-17 LAB
ANION GAP SERPL CALCULATED.3IONS-SCNC: 10 MMOL/L (ref 4–13)
BASOPHILS # BLD AUTO: 0.01 THOUSANDS/ΜL (ref 0–0.1)
BASOPHILS NFR BLD AUTO: 0 % (ref 0–1)
BUN SERPL-MCNC: 17 MG/DL (ref 5–25)
CALCIUM SERPL-MCNC: 7.6 MG/DL (ref 8.3–10.1)
CHLORIDE SERPL-SCNC: 108 MMOL/L (ref 100–108)
CO2 SERPL-SCNC: 22 MMOL/L (ref 21–32)
CREAT SERPL-MCNC: 0.93 MG/DL (ref 0.6–1.3)
EOSINOPHIL # BLD AUTO: 0.06 THOUSAND/ΜL (ref 0–0.61)
EOSINOPHIL NFR BLD AUTO: 1 % (ref 0–6)
ERYTHROCYTE [DISTWIDTH] IN BLOOD BY AUTOMATED COUNT: 14.6 % (ref 11.6–15.1)
GFR SERPL CREATININE-BSD FRML MDRD: 60 ML/MIN/1.73SQ M
GLUCOSE SERPL-MCNC: 111 MG/DL (ref 65–140)
HCT VFR BLD AUTO: 35.2 % (ref 34.8–46.1)
HGB BLD-MCNC: 11.3 G/DL (ref 11.5–15.4)
IMM GRANULOCYTES # BLD AUTO: 0.03 THOUSAND/UL (ref 0–0.2)
IMM GRANULOCYTES NFR BLD AUTO: 0 % (ref 0–2)
LYMPHOCYTES # BLD AUTO: 1.29 THOUSANDS/ΜL (ref 0.6–4.47)
LYMPHOCYTES NFR BLD AUTO: 15 % (ref 14–44)
MCH RBC QN AUTO: 30.9 PG (ref 26.8–34.3)
MCHC RBC AUTO-ENTMCNC: 32.1 G/DL (ref 31.4–37.4)
MCV RBC AUTO: 96 FL (ref 82–98)
MONOCYTES # BLD AUTO: 0.7 THOUSAND/ΜL (ref 0.17–1.22)
MONOCYTES NFR BLD AUTO: 8 % (ref 4–12)
NEUTROPHILS # BLD AUTO: 6.57 THOUSANDS/ΜL (ref 1.85–7.62)
NEUTS SEG NFR BLD AUTO: 76 % (ref 43–75)
NRBC BLD AUTO-RTO: 0 /100 WBCS
PLATELET # BLD AUTO: 143 THOUSANDS/UL (ref 149–390)
PMV BLD AUTO: 11.7 FL (ref 8.9–12.7)
POTASSIUM SERPL-SCNC: 3.6 MMOL/L (ref 3.5–5.3)
PROCALCITONIN SERPL-MCNC: 82.88 NG/ML
PROCALCITONIN SERPL-MCNC: 95.02 NG/ML
RBC # BLD AUTO: 3.66 MILLION/UL (ref 3.81–5.12)
SODIUM SERPL-SCNC: 140 MMOL/L (ref 136–145)
WBC # BLD AUTO: 8.66 THOUSAND/UL (ref 4.31–10.16)

## 2019-12-17 PROCEDURE — 80048 BASIC METABOLIC PNL TOTAL CA: CPT | Performed by: PHYSICIAN ASSISTANT

## 2019-12-17 PROCEDURE — 85025 COMPLETE CBC W/AUTO DIFF WBC: CPT | Performed by: PHYSICIAN ASSISTANT

## 2019-12-17 PROCEDURE — 87040 BLOOD CULTURE FOR BACTERIA: CPT | Performed by: PHYSICIAN ASSISTANT

## 2019-12-17 PROCEDURE — 99232 SBSQ HOSP IP/OBS MODERATE 35: CPT | Performed by: NURSE PRACTITIONER

## 2019-12-17 PROCEDURE — 84145 PROCALCITONIN (PCT): CPT | Performed by: PHYSICIAN ASSISTANT

## 2019-12-17 RX ORDER — ECHINACEA PURPUREA EXTRACT 125 MG
2 TABLET ORAL 4 TIMES DAILY PRN
Status: DISCONTINUED | OUTPATIENT
Start: 2019-12-17 | End: 2019-12-18 | Stop reason: HOSPADM

## 2019-12-17 RX ORDER — ACETAMINOPHEN 325 MG/1
650 TABLET ORAL EVERY 6 HOURS PRN
Status: DISCONTINUED | OUTPATIENT
Start: 2019-12-17 | End: 2019-12-18 | Stop reason: HOSPADM

## 2019-12-17 RX ADMIN — CEFEPIME HYDROCHLORIDE 2000 MG: 2 INJECTION, POWDER, FOR SOLUTION INTRAVENOUS at 08:33

## 2019-12-17 RX ADMIN — HEPARIN SODIUM 5000 UNITS: 5000 INJECTION INTRAVENOUS; SUBCUTANEOUS at 14:12

## 2019-12-17 RX ADMIN — CEFEPIME HYDROCHLORIDE 2000 MG: 2 INJECTION, POWDER, FOR SOLUTION INTRAVENOUS at 20:25

## 2019-12-17 RX ADMIN — HEPARIN SODIUM 5000 UNITS: 5000 INJECTION INTRAVENOUS; SUBCUTANEOUS at 05:06

## 2019-12-17 RX ADMIN — ACETAMINOPHEN 650 MG: 325 TABLET, FILM COATED ORAL at 11:25

## 2019-12-17 NOTE — ASSESSMENT & PLAN NOTE
· Presenting with fever, abdominal pain, nausea/vomiting/diarrhea  · UA strongly suggestive of UTI and left kidney with evidence of pyelonephritis from CT abdomen/pelvis; of note is surgically absent right kidney  · Fortunately patient is hemodynamically stable and nontoxic appearing at time of my evaluation  · 1 of 2 blood cultures were positive yesterday for gram-negative rods Rocephin was discontinued and patient was initiated on cefepime  · Repeat blood cultures pending continue to trend  · Trend urine culture for results and sensitivity tailor antibiotics to this findings  · Patient fortunately remains without leukocytosis afebrile overnight  · Additionally will need to closely monitor renal function in setting of solitary kidney

## 2019-12-17 NOTE — ASSESSMENT & PLAN NOTE
· Present admission suspected in setting of pyelonephritis evident by T-max of 103 2° tachypnea 25 neutrophilia 92%    Elevated procalcitonin 95 02/82 88  · Continue IV antibiotic  · Continue IV hydration  · Continue to trend blood cultures patient's versus came back with 1 positive for gram-negative rods  · Continue to trend urine cultures

## 2019-12-17 NOTE — PROGRESS NOTES
Progress Note - [place] 1945, 66 y o  female MRN: [id number]    Unit/Bed#: -01 Encounter: [id number]    Primary Care Provider: No primary care provider on file  Date and time admitted to hospital: 12/16/2019  2:10 AM        * Pyelonephritis  Assessment & Plan  · Presenting with fever, abdominal pain, nausea/vomiting/diarrhea  · UA strongly suggestive of UTI and left kidney with evidence of pyelonephritis from CT abdomen/pelvis; of note is surgically absent right kidney  · Fortunately patient is hemodynamically stable and nontoxic appearing at time of my evaluation  · 1 of 2 blood cultures were positive yesterday for gram-negative rods Rocephin was discontinued and patient was initiated on cefepime  · Repeat blood cultures pending continue to trend  · Trend urine culture for results and sensitivity tailor antibiotics to this findings  · Patient fortunately remains without leukocytosis afebrile overnight  · Additionally will need to closely monitor renal function in setting of solitary kidney    Sepsis ([facility] [address] )  Assessment & Plan  · Present admission suspected in setting of pyelonephritis evident by T-max of 103 2° tachypnea 25 neutrophilia 92%  Elevated procalcitonin 95 02/82 88  · Continue IV antibiotic  · Continue IV hydration  · Continue to trend blood cultures patient's versus came back with 1 positive for gram-negative rods  · Continue to trend urine cultures    Hyperglycemia  Assessment & Plan  · Elevated on admission; patient denies history of diabetes  · A1c obtained currently at 6 1  · Likely stress response in setting of illness continue to monitor    Nausea, vomiting and diarrhea  Assessment & Plan  · Likely in setting of pyelonephritis, however cannot exclude possible concomitant viral gastroenteritis as patient with multiple recent sick contacts  · Patient family deny any recent hospitalizations or broad-spectrum antibiotic use  · Supportive care at this time  · P r n   Zofran as antiemetic    Hypomagnesemia  Assessment & Plan  · Magnesium 1 6 on admission and to receive 4g IV magnesium  · Magnesium repeat showing 3 4  · Repeat in a m  Hypokalemia  Assessment & Plan  · K 2 6 on admission, likely in setting of GI losses  · Received 40 mEq p o  K and is currently receiving 2nd of 20 mg IV K  · Now normalized status post replete  · Discontinue telemetry        VTE Pharmacologic Prophylaxis:   Pharmacologic: Heparin  Mechanical VTE Prophylaxis in Place: Yes    Patient Centered Rounds: I have performed bedside rounds with nursing staff today  Discussions with Specialists or Other Care Team Provider:  None    Education and Discussions with Family / Patient:  Patient and daughter at bedside    Time Spent for Care: 30 minutes  More than 50% of total time spent on counseling and coordination of care as described above  Current Length of Stay: 1 day(s)    Current Patient Status: Inpatient   Certification Statement: The patient will continue to require additional inpatient hospital stay due to Ongoing acute intervention in setting of sepsis/pyelonephritis    Discharge Plan:  Not medically cleared    Code Status: Level 1 - Full Code      Subjective:   Patient's only complaint is that she has been experience some back pain in the flank region on the left side  Patient denies fevers or chills overnight has no generalized complaints  Objective:     Vitals:   Temp (24hrs), Av °F (36 7 °C), Min:97 8 °F (36 6 °C), Max:98 4 °F (36 9 °C)    Temp:  [97 8 °F (36 6 °C)-98 4 °F (36 9 °C)] 97 8 °F (36 6 °C)  HR:  [58-71] 58  Resp:  [18-19] 18  BP: ()/(57-81) 110/57  SpO2:  [96 %-100 %] 98 %  Body mass index is 26 69 kg/m²  Input and Output Summary (last 24 hours):        Intake/Output Summary (Last 24 hours) at 2019 1129  Last data filed at 2019 0806  Gross per 24 hour   Intake 220 ml   Output 1300 ml   Net -1080 ml       Physical Exam:     Physical Exam   Constitutional: She is oriented to person, place, and time  HENT:   Head: Normocephalic and atraumatic  Eyes: Conjunctivae and EOM are normal    Neck: Normal range of motion  Cardiovascular: Normal rate, regular rhythm and normal heart sounds  Pulmonary/Chest: Effort normal and breath sounds normal    Abdominal: Soft  Bowel sounds are normal    Genitourinary:   Genitourinary Comments: Percussion to left flank region did not appreciate any discomfort and patient also denied pain currently   Musculoskeletal: Normal range of motion  Neurological: She is alert and oriented to person, place, and time  Skin: Skin is warm and dry  Psychiatric: She has a normal mood and affect  Her behavior is normal          Additional Data:     Labs:    Results from last 7 days   Lab Units 12/17/19 0457   WBC Thousand/uL 8 66   HEMOGLOBIN g/dL 11 3*   HEMATOCRIT % 35 2   PLATELETS Thousands/uL 143*   NEUTROS PCT % 76*   LYMPHS PCT % 15   MONOS PCT % 8   EOS PCT % 1     Results from last 7 days   Lab Units 12/17/19 0457 12/16/19 0229   SODIUM mmol/L 140   < > 134*   POTASSIUM mmol/L 3 6   < > 2 6*   CHLORIDE mmol/L 108   < > 99*   CO2 mmol/L 22   < > 22   BUN mg/dL 17   < > 21   CREATININE mg/dL 0 93   < > 1 12   ANION GAP mmol/L 10   < > 13   CALCIUM mg/dL 7 6*   < > 7 7*   ALBUMIN g/dL  --   --  2 9*   TOTAL BILIRUBIN mg/dL  --   --  0 80   ALK PHOS U/L  --   --  90   ALT U/L  --   --  41   AST U/L  --   --  63*   GLUCOSE RANDOM mg/dL 111   < > 196*    < > = values in this interval not displayed  Results from last 7 days   Lab Units 12/16/19  0414   POC GLUCOSE mg/dl 131     Results from last 7 days   Lab Units 12/16/19  0955   HEMOGLOBIN A1C % 6 1     Results from last 7 days   Lab Units 12/17/19 0457 12/16/19  1629 12/16/19  0229   LACTIC ACID mmol/L  --   --  1 6   PROCALCITONIN ng/ml 82 88* 95 02*  --            * I Have Reviewed All Lab Data Listed Above  * Additional Pertinent Lab Tests Reviewed:  All Labs For Current Hospital Admission Reviewed    Imaging:    Imaging Reports Reviewed Today Include:  As mentioned above      Recent Cultures (last 7 days):     Results from last 7 days   Lab Units 12/17/19  0536 12/16/19  0411 12/16/19  0230 12/16/19  0229   BLOOD CULTURE  Received in Microbiology Lab  Culture in Progress  Received in Microbiology Lab  Culture in Progress  --   --  No Growth at 24 hrs  GRAM STAIN RESULT   --   --  Gram negative rods*  --    URINE CULTURE   --  >100,000 cfu/ml Gram Negative Jordon Enteric Like*  --   --        Last 24 Hours Medication List:     Current Facility-Administered Medications:  acetaminophen 650 mg Oral Q6H PRN AVEL Xie    cefepime 2,000 mg Intravenous Q12H Saji Ware PA-C Last Rate: 2,000 mg (12/17/19 9265)   heparin (porcine) 5,000 Units Subcutaneous Q8H [address] Cheryln Folk, DO    ondansetron 4 mg Intravenous Q6H PRN Cheryln Folk, DO    sodium chloride 2 spray Each Nare 4x Daily PRN AVEL Xie         Today, Patient Was Seen By: AVEL Xie    ** Please Note: Dictation voice to text software may have been used in the creation of this document   **

## 2019-12-17 NOTE — UTILIZATION REVIEW
Notification of Inpatient Admission/Inpatient Authorization Request   This is a Notification of Inpatient Admission for [address]  Be advised that this patient was admitted to our facility under Inpatient Status  Contact Beulah Darling at [phone] for additional admission information  [facility] DEPT DEDICATED Agusto Bashir [phone]  Patient Name:   Aquiles Hughes   YOB: 1945       [address]   [address]:   [facility]    Tax ID: [id number]  NPI: [id number] Attending Provider/NPI: Marea Sever [[id number]]   Place of Service Code: 24     Place of Service Name:  [address] [place] [address]   Start Date: 12/16/19 1438     Discharge Date & Time: No discharge date for patient encounter  Type of Admission: Inpatient Status Discharge Disposition   (if discharged): Final discharge disposition not confirmed   Patient Diagnoses: Acute pyelonephritis [N10]  Generalized abdominal pain [R10 84]     Orders: Admission Orders (From admission, onward)     Ordered        12/16/19 1438  Inpatient Admission  Once         12/16/19 0451  Place in Observation  Once                    Assigned Utilization Review Contact: Beulah Darling  Utilization   Network Utilization Review Department  Phone: [phone]; Fax [phone]  Email: [email or web address]   ATTENTION PAYERS: Please call the assigned Utilization  directly with any questions or concerns ALL voicemails in the department are confidential  Send all requests for admission clinical reviews, approved or denied determinations and any other requests to dedicated fax number belonging to the campus where the patient is receiving treatment

## 2019-12-17 NOTE — ASSESSMENT & PLAN NOTE
· K 2 6 on admission, likely in setting of GI losses      · Received 40 mEq p o  K and is currently receiving 2nd of 20 mg IV K  · Now normalized status post replete  · Discontinue telemetry

## 2019-12-17 NOTE — ASSESSMENT & PLAN NOTE
· Elevated on admission; patient denies history of diabetes  · A1c obtained currently at 6 1  · Likely stress response in setting of illness continue to monitor

## 2019-12-17 NOTE — PROGRESS NOTES
Pt states she will wear the A Pumps at night only  Education given about use for prevention of DVT's, daughter in room and explained to her mom

## 2019-12-17 NOTE — PLAN OF CARE
Problem: Potential for Falls  Goal: Patient will remain free of falls  Description  INTERVENTIONS:  - Assess patient frequently for physical needs  -  Identify cognitive and physical deficits and behaviors that affect risk of falls    -  [place] fall precautions as indicated by assessment   - Educate patient/family on patient safety including physical limitations  - Instruct patient to call for assistance with activity based on assessment  - Modify environment to reduce risk of injury  - Consider OT/PT consult to assist with strengthening/mobility  Outcome: Progressing     Problem: PAIN - ADULT  Goal: Verbalizes/displays adequate comfort level or baseline comfort level  Description  Interventions:  - Encourage patient to monitor pain and request assistance  - Assess pain using appropriate pain scale  - Administer analgesics based on type and severity of pain and evaluate response  - Implement non-pharmacological measures as appropriate and evaluate response  - Consider cultural and social influences on pain and pain management  - Notify physician/advanced practitioner if interventions unsuccessful or patient reports new pain  Outcome: Progressing     Problem: SAFETY ADULT  Goal: Maintain or return to baseline ADL function  Description  INTERVENTIONS:  -  Assess patient's ability to carry out ADLs; assess patient's baseline for ADL function and identify physical deficits which impact ability to perform ADLs (bathing, care of mouth/teeth, toileting, grooming, dressing, etc )  - Assess/evaluate cause of self-care deficits   - Assess range of motion  - Assess patient's mobility; develop plan if impaired  - Assess patient's need for assistive devices and provide as appropriate  - Encourage maximum independence but intervene and supervise when necessary  - Involve family in performance of ADLs  - Assess for home care needs following discharge   - Consider OT consult to assist with ADL evaluation and planning for discharge  - Provide patient education as appropriate  Outcome: Progressing  Goal: Maintain or return mobility status to optimal level  Description  INTERVENTIONS:  - Assess patient's baseline mobility status (ambulation, transfers, stairs, etc )    - Identify cognitive and physical deficits and behaviors that affect mobility  - Identify mobility aids required to assist with transfers and/or ambulation (gait belt, sit-to-stand, lift, walker, cane, etc )  - [place] fall precautions as indicated by assessment  - Record patient progress and toleration of activity level on Mobility SBAR; progress patient to next Phase/Stage  - Instruct patient to call for assistance with activity based on assessment  - Consider rehabilitation consult to assist with strengthening/weightbearing, etc   Outcome: Progressing     Problem: DISCHARGE PLANNING  Goal: Discharge to home or other facility with appropriate resources  Description  INTERVENTIONS:  - Identify barriers to discharge w/patient and caregiver  - Arrange for needed discharge resources and transportation as appropriate  - Identify discharge learning needs (meds, wound care, etc )  - Arrange for interpretive services to assist at discharge as needed  - Refer to Case Management Department for coordinating discharge planning if the patient needs post-hospital services based on physician/advanced practitioner order or complex needs related to functional status, cognitive ability, or social support system  Outcome: Progressing     Problem: Knowledge Deficit  Goal: Patient/family/caregiver demonstrates understanding of disease process, treatment plan, medications, and discharge instructions  Description  Complete learning assessment and assess knowledge base    Interventions:  - Provide teaching at level of understanding  - Provide teaching via preferred learning methods  Outcome: Progressing     Problem: Nutrition/Hydration-ADULT  Goal: Nutrient/Hydration intake appropriate for improving, restoring or maintaining nutritional needs  Description  Monitor and assess patient's nutrition/hydration status for malnutrition  Collaborate with interdisciplinary team and initiate plan and interventions as ordered  Monitor patient's weight and dietary intake as ordered or per policy  Utilize nutrition screening tool and intervene as necessary  Determine patient's food preferences and provide high-protein, high-caloric foods as appropriate       INTERVENTIONS:  - Monitor oral intake, urinary output, labs, and treatment plans  - Assess nutrition and hydration status and recommend course of action  - Evaluate amount of meals eaten  - Assist patient with eating if necessary   - Allow adequate time for meals  - Recommend/ encourage appropriate diets, oral nutritional supplements, and vitamin/mineral supplements  - Order, calculate, and assess calorie counts as needed  - Recommend, monitor, and adjust tube feedings and TPN/PPN based on assessed needs  - Assess need for intravenous fluids  - Provide specific nutrition/hydration education as appropriate  - Include patient/family/caregiver in decisions related to nutrition  Outcome: Progressing

## 2019-12-17 NOTE — UTILIZATION REVIEW
Initial Clinical Review    Admission: Date/Time/Statement: Inpatient Admission Orders (From admission, onward)     Ordered        12/16/19 1438  Inpatient Admission  Once                    Inpatient Admission     Standing Status:   Standing     Number of Occurrences:   1     Order Specific Question:   Admitting Physician     Answer:   Lisa Nicholas [[id number]]     Order Specific Question:   Level of Care     Answer:   Med Surg [[id number]]     Order Specific Question:   Estimated length of stay     Answer:   More than 2 Midnights     Order Specific Question:   Certification     Answer:   I certify that inpatient services are medically necessary for this patient for a duration of greater than two midnights  See H&P and MD Progress Notes for additional information about the patient's course of treatment  ED Arrival Information     Expected Arrival Acuity Means of Arrival Escorted By Service Admission Type    - 12/16/2019 02:10 Urgent [address] [facility] Hospitalist Urgent    Arrival Complaint    Nausea        Chief Complaint   Patient presents with    Diarrhea     Patient brought in by EMS for diarrhea, nausea and vomiting that started last 24 hours   Vomiting     Assessment/Plan: 67 yo female to ED from home via EMS w/ abd pain , N/V/D   Sudden onset this am   Dec appetite and poor po intake   + fever and chills  In ED found to have K 2 6 and mg 1 6 CT revealed L kidney pyelnephritis and started on ceftriaxone   Admitted IP status with plan to cont IV abx , f/u bld and ua cx, monitor renal function   K and Mg repleted a, tele and monitor       PE : Mild left costovertebral angle tenderness   ED Triage Vitals   Temperature Pulse Respirations Blood Pressure SpO2   12/16/19 0221 12/16/19 0215 12/16/19 0221 12/16/19 0215 12/16/19 0215   (!) 103 2 °F (39 6 °C) 89 18 121/60 94 %      Temp Source Heart Rate Source Patient Position - Orthostatic VS BP Location FiO2 (%)   12/16/19 0221 12/16/19 0221 12/16/19 0221 12/16/19 0221 --   Oral Monitor Lying Right arm       Pain Score       12/16/19 0918       No Pain        Wt Readings from Last 1 Encounters:   12/16/19 66 2 kg (145 lb 15 1 oz)     Additional Vital Signs:   12/17/19 0803  97 8 °F (36 6 °C)  58  18  110/57  --  98 %  None (Room air)  Lying   12/16/19 2201  98 4 °F (36 9 °C)  71  18  99/57  75  96 %  None (Room air)  Lying   12/16/19 1621  97 9 °F (36 6 °C)  64  18  117/81  --  100 %  None (Room air)  Lying   12/16/19 1300  --  58  18  125/65  --  100 %  --  Lying   12/16/19 1230  --  58  19  101/58  --  99 %  None (Room air)  Lying   12/16/19 1053  97 7 °F (36 5 °C)  65  18  105/56  75  96 %  None (Room air)  Lying   12/16/19 0918  --  66  20  108/75  --  100 %  None (Room air)  Lying   12/16/19 0700  98 1 °F (36 7 °C)  66  18  92/69  74  99 %  Nasal cannula  Lying   12/16/19 0630  --  67  20  94/51  --  99 %  --  --   12/16/19 0600  --  --  --  97/55  --  --  --  --   12/16/19 0530  --  68  21  96/51  --  100 %  --  --   12/16/19 0515  --  71  25Abnormal   --  --  100 %  --  --   12/16/19 0500  --  72  25Abnormal   97/55  --  100 %  --  --   12/16/19 0439  --  --  --  --  --  --  None (Room air)  --   12/16/19 0430  --  75  22  97/54  --  95 %  --  --   12/16/19 0422  --  73  18  --  --  96 %  None (Room air)  Lying   12/16/19 0411  99 1 °F (37 3 °C)  --  --  --  --  --  --  --   12/16/19 0221  103 2 °F (39 6 °C)Abnormal   85  18  121/60  --  92 %  None (Room air)         Pertinent Labs/Diagnostic Test Results:   12/16 CXR - wnl   12/16 CT abd - 1  The right kidney is absent  The left kidney demonstrates moderate hydronephrosis and perinephric stranding  No definite obstructive calculus is seen  Findings may be due to recently passed calculus versus pyelonephritis, correlate with urinalysis  2   Diverticulosis without evidence of diverticulitis    Liquid stool in the colon may be due to diarrheal illness    12/16 EKG- NSR   Results from last 7 days   Lab Units 12/17/19  0457 12/16/19  0955 12/16/19  0229   WBC Thousand/uL 8 66  --  8 05   HEMOGLOBIN g/dL 11 3*  --  11 9   HEMATOCRIT % 35 2  --  35 7   PLATELETS Thousands/uL 143* 131* 132*   NEUTROS ABS Thousands/µL 6 57  --  7 33     Results from last 7 days   Lab Units 12/17/19  0457 12/16/19  0955 12/16/19  0357 12/16/19  0229   SODIUM mmol/L 140 142  --  134*   POTASSIUM mmol/L 3 6 4 0  --  2 6*   CHLORIDE mmol/L 108 109*  --  99*   CO2 mmol/L 22 22  --  22   ANION GAP mmol/L 10 11  --  13   BUN mg/dL 17 15  --  21   CREATININE mg/dL 0 93 1 03  --  1 12   EGFR ml/min/1 73sq m 60 53  --  48   CALCIUM mg/dL 7 6* 7 3*  --  7 7*   MAGNESIUM mg/dL  --  3 4* 1 6  --      Results from last 7 days   Lab Units 12/16/19  0229   AST U/L 63*   ALT U/L 41   ALK PHOS U/L 90   TOTAL PROTEIN g/dL 7 0   ALBUMIN g/dL 2 9*   TOTAL BILIRUBIN mg/dL 0 80     Results from last 7 days   Lab Units 12/16/19  0414   POC GLUCOSE mg/dl 131     Results from last 7 days   Lab Units 12/17/19  0457 12/16/19  0955 12/16/19  0229   GLUCOSE RANDOM mg/dL 111 182* 196*     Results from last 7 days   Lab Units 12/16/19  0955   HEMOGLOBIN A1C % 6 1   EAG mg/dl 128     Results from last 7 days   Lab Units 12/16/19  1629   PROCALCITONIN ng/ml 95 02*     Results from last 7 days   Lab Units 12/16/19  0229   LACTIC ACID mmol/L 1 6     Results from last 7 days   Lab Units 12/16/19  0357   LIPASE u/L 92     Results from last 7 days   Lab Units 12/16/19  0411   CLARITY UA  Slightly Cloudy   COLOR UA  Yellow   SPEC GRAV UA  1 010   PH UA  5 5   GLUCOSE UA mg/dl Negative   KETONES UA mg/dl 40 (2+)*   BLOOD UA  Large*   PROTEIN UA mg/dl 30 (1+)*   NITRITE UA  Positive*   BILIRUBIN UA  Negative   UROBILINOGEN UA E U /dl 0 2   LEUKOCYTES UA  Large*   WBC UA /hpf 10-20*   RBC UA /hpf 0-1*   BACTERIA UA /hpf Innumerable*   EPITHELIAL CELLS WET PREP /hpf Occasional     Results from last 7 days   Lab Units 12/16/19  0357   INFLUENZA A PCR  None Detected   RSV PCR  None Detected     Results from last 7 days   Lab Units 12/16/19  0230 12/16/19  0229   BLOOD CULTURE   --  No Growth at 24 hrs  GRAM STAIN RESULT  Gram negative rods*  --      ED Treatment:   Medication Administration from 12/16/2019 0210 to 12/16/2019 1528       Date/Time Order Dose Route Action     12/16/2019 0247 acetaminophen (TYLENOL) tablet 650 mg 650 mg Oral Given     12/16/2019 0339 sodium chloride 0 9 % bolus 1,000 mL 1,000 mL Intravenous New Bag     12/16/2019 0410 ondansetron (ZOFRAN) injection 4 mg 4 mg Intravenous Given     12/16/2019 0409 dicyclomine (BENTYL) tablet 20 mg 20 mg Oral Given     12/16/2019 0410 potassium chloride (K-DUR,KLOR-CON) CR tablet 40 mEq 40 mEq Oral Given     12/16/2019 0601 potassium chloride 20 mEq IVPB (premix) 20 mEq Intravenous New Bag     12/16/2019 0410 potassium chloride 20 mEq IVPB (premix) 20 mEq Intravenous New Bag     12/16/2019 0442 ceftriaxone (ROCEPHIN) 1 g/50 mL in dextrose IVPB 1,000 mg Intravenous New Bag     12/16/2019 0449 sodium chloride 0 9 % bolus 1,000 mL 1,000 mL Intravenous New Bag     12/16/2019 0531 magnesium sulfate 4 g/100 mL IVPB (premix) 4 g 4 g Intravenous New Bag     12/16/2019 4219 heparin (porcine) subcutaneous injection 5,000 Units 5,000 Units Subcutaneous Given        Past Medical History:   Diagnosis Date    Hyperlipidemia     Renal disorder      Present on Admission:   Pyelonephritis   Hypokalemia   Hypomagnesemia   Nausea, vomiting and diarrhea   Hyperlipidemia   Hyperglycemia      Admitting Diagnosis: Acute pyelonephritis [N10]  Generalized abdominal pain [R10 84]  Age/Sex: 66 y o  female  Admission Orders:  Scheduled Medications:    Medications:  cefepime 2,000 mg Intravenous Q12H   heparin (porcine) 5,000 Units Subcutaneous Q8H [address]     Continuous IV Infusions:     PRN Meds:    ondansetron 4 mg Intravenous Q6H PRN     SCD  Tele     Network Utilization Review Department  [email or web address]  ATTENTION: Please call with any questions or concerns to [phone] and carefully listen to the prompts so that you are directed to the right person  All voicemails are confidential   Bradley Stewart all requests for admission clinical reviews, approved or denied determinations and any other requests to dedicated fax number below belonging to the campus where the patient is receiving treatment   List of dedicated fax numbers for the Facilities:  [address] DENIALS (Administrative/Medical Necessity) [phone]   [address] (Maternity/NICU/Pediatrics) [phone]   Nic Needs [phone]   Lynette Leiva [phone]   Roni Tao [phone]   [address] [facility]  [phone]   [address] [place] [address] 1525 [facility] [phone]   Kirill Saldaña [phone]   [address] [facility]  [address] [facility] [address] [facility] [phone]

## 2019-12-17 NOTE — ASSESSMENT & PLAN NOTE
· Magnesium 1 6 on admission and to receive 4g IV magnesium  · Magnesium repeat showing 3 4  · Repeat in a m

## 2019-12-17 NOTE — QUICK NOTE
Was paged regarding patient with 1/2 BC (+) for gram negative rods  Admitted here for sepsis secondary to pyelonephritis  Switch to IV cefepime and await additional BC and urine culture, narrow based on sensitivities  Obtain repeat blood cultures in the AM  Monitor      Elly Mendosa PA-C

## 2019-12-17 NOTE — PLAN OF CARE
Problem: Potential for Falls  Goal: Patient will remain free of falls  Description  INTERVENTIONS:  - Assess patient frequently for physical needs  -  Identify cognitive and physical deficits and behaviors that affect risk of falls    -  [place] fall precautions as indicated by assessment   - Educate patient/family on patient safety including physical limitations  - Instruct patient to call for assistance with activity based on assessment  - Modify environment to reduce risk of injury  - Consider OT/PT consult to assist with strengthening/mobility  Outcome: Progressing     Problem: PAIN - ADULT  Goal: Verbalizes/displays adequate comfort level or baseline comfort level  Description  Interventions:  - Encourage patient to monitor pain and request assistance  - Assess pain using appropriate pain scale  - Administer analgesics based on type and severity of pain and evaluate response  - Implement non-pharmacological measures as appropriate and evaluate response  - Consider cultural and social influences on pain and pain management  - Notify physician/advanced practitioner if interventions unsuccessful or patient reports new pain  Outcome: Progressing     Problem: SAFETY ADULT  Goal: Maintain or return to baseline ADL function  Description  INTERVENTIONS:  -  Assess patient's ability to carry out ADLs; assess patient's baseline for ADL function and identify physical deficits which impact ability to perform ADLs (bathing, care of mouth/teeth, toileting, grooming, dressing, etc )  - Assess/evaluate cause of self-care deficits   - Assess range of motion  - Assess patient's mobility; develop plan if impaired  - Assess patient's need for assistive devices and provide as appropriate  - Encourage maximum independence but intervene and supervise when necessary  - Involve family in performance of ADLs  - Assess for home care needs following discharge   - Consider OT consult to assist with ADL evaluation and planning for discharge  - Provide patient education as appropriate  Outcome: Progressing  Goal: Maintain or return mobility status to optimal level  Description  INTERVENTIONS:  - Assess patient's baseline mobility status (ambulation, transfers, stairs, etc )    - Identify cognitive and physical deficits and behaviors that affect mobility  - Identify mobility aids required to assist with transfers and/or ambulation (gait belt, sit-to-stand, lift, walker, cane, etc )  - [place] fall precautions as indicated by assessment  - Record patient progress and toleration of activity level on Mobility SBAR; progress patient to next Phase/Stage  - Instruct patient to call for assistance with activity based on assessment  - Consider rehabilitation consult to assist with strengthening/weightbearing, etc   Outcome: Progressing     Problem: DISCHARGE PLANNING  Goal: Discharge to home or other facility with appropriate resources  Description  INTERVENTIONS:  - Identify barriers to discharge w/patient and caregiver  - Arrange for needed discharge resources and transportation as appropriate  - Identify discharge learning needs (meds, wound care, etc )  - Arrange for interpretive services to assist at discharge as needed  - Refer to Case Management Department for coordinating discharge planning if the patient needs post-hospital services based on physician/advanced practitioner order or complex needs related to functional status, cognitive ability, or social support system  Outcome: Progressing     Problem: Knowledge Deficit  Goal: Patient/family/caregiver demonstrates understanding of disease process, treatment plan, medications, and discharge instructions  Description  Complete learning assessment and assess knowledge base    Interventions:  - Provide teaching at level of understanding  - Provide teaching via preferred learning methods  Outcome: Progressing     Problem: Nutrition/Hydration-ADULT  Goal: Nutrient/Hydration intake appropriate for improving, restoring or maintaining nutritional needs  Description  Monitor and assess patient's nutrition/hydration status for malnutrition  Collaborate with interdisciplinary team and initiate plan and interventions as ordered  Monitor patient's weight and dietary intake as ordered or per policy  Utilize nutrition screening tool and intervene as necessary  Determine patient's food preferences and provide high-protein, high-caloric foods as appropriate       INTERVENTIONS:  - Monitor oral intake, urinary output, labs, and treatment plans  - Assess nutrition and hydration status and recommend course of action  - Evaluate amount of meals eaten  - Assist patient with eating if necessary   - Allow adequate time for meals  - Recommend/ encourage appropriate diets, oral nutritional supplements, and vitamin/mineral supplements  - Order, calculate, and assess calorie counts as needed  - Recommend, monitor, and adjust tube feedings and TPN/PPN based on assessed needs  - Assess need for intravenous fluids  - Provide specific nutrition/hydration education as appropriate  - Include patient/family/caregiver in decisions related to nutrition  Outcome: Progressing

## 2019-12-18 VITALS
WEIGHT: 145.94 LBS | HEIGHT: 62 IN | RESPIRATION RATE: 21 BRPM | OXYGEN SATURATION: 97 % | DIASTOLIC BLOOD PRESSURE: 57 MMHG | BODY MASS INDEX: 26.86 KG/M2 | TEMPERATURE: 98 F | HEART RATE: 66 BPM | SYSTOLIC BLOOD PRESSURE: 121 MMHG

## 2019-12-18 LAB
ANION GAP SERPL CALCULATED.3IONS-SCNC: 9 MMOL/L (ref 4–13)
BACTERIA BLD CULT: ABNORMAL
BACTERIA UR CULT: ABNORMAL
BACTERIA UR CULT: ABNORMAL
BUN SERPL-MCNC: 14 MG/DL (ref 5–25)
CALCIUM SERPL-MCNC: 8.2 MG/DL (ref 8.3–10.1)
CHLORIDE SERPL-SCNC: 107 MMOL/L (ref 100–108)
CO2 SERPL-SCNC: 26 MMOL/L (ref 21–32)
CREAT SERPL-MCNC: 0.84 MG/DL (ref 0.6–1.3)
ERYTHROCYTE [DISTWIDTH] IN BLOOD BY AUTOMATED COUNT: 14.4 % (ref 11.6–15.1)
GFR SERPL CREATININE-BSD FRML MDRD: 68 ML/MIN/1.73SQ M
GLUCOSE SERPL-MCNC: 104 MG/DL (ref 65–140)
GRAM STN SPEC: ABNORMAL
HCT VFR BLD AUTO: 35 % (ref 34.8–46.1)
HGB BLD-MCNC: 11.5 G/DL (ref 11.5–15.4)
MCH RBC QN AUTO: 31.2 PG (ref 26.8–34.3)
MCHC RBC AUTO-ENTMCNC: 32.9 G/DL (ref 31.4–37.4)
MCV RBC AUTO: 95 FL (ref 82–98)
PLATELET # BLD AUTO: 154 THOUSANDS/UL (ref 149–390)
PMV BLD AUTO: 11.8 FL (ref 8.9–12.7)
POTASSIUM SERPL-SCNC: 3.4 MMOL/L (ref 3.5–5.3)
PROCALCITONIN SERPL-MCNC: 42.17 NG/ML
RBC # BLD AUTO: 3.69 MILLION/UL (ref 3.81–5.12)
SODIUM SERPL-SCNC: 142 MMOL/L (ref 136–145)
WBC # BLD AUTO: 6.78 THOUSAND/UL (ref 4.31–10.16)

## 2019-12-18 PROCEDURE — 99239 HOSP IP/OBS DSCHRG MGMT >30: CPT | Performed by: NURSE PRACTITIONER

## 2019-12-18 PROCEDURE — 84145 PROCALCITONIN (PCT): CPT | Performed by: NURSE PRACTITIONER

## 2019-12-18 PROCEDURE — 85027 COMPLETE CBC AUTOMATED: CPT | Performed by: NURSE PRACTITIONER

## 2019-12-18 PROCEDURE — 80048 BASIC METABOLIC PNL TOTAL CA: CPT | Performed by: NURSE PRACTITIONER

## 2019-12-18 RX ORDER — CEPHALEXIN 500 MG/1
500 CAPSULE ORAL EVERY 12 HOURS SCHEDULED
Qty: 10 CAPSULE | Refills: 0 | Status: SHIPPED | OUTPATIENT
Start: 2019-12-19 | End: 2019-12-24

## 2019-12-18 RX ORDER — POTASSIUM CHLORIDE 20 MEQ/1
20 TABLET, EXTENDED RELEASE ORAL ONCE
Status: COMPLETED | OUTPATIENT
Start: 2019-12-18 | End: 2019-12-18

## 2019-12-18 RX ADMIN — HEPARIN SODIUM 5000 UNITS: 5000 INJECTION INTRAVENOUS; SUBCUTANEOUS at 14:56

## 2019-12-18 RX ADMIN — ACETAMINOPHEN 650 MG: 325 TABLET, FILM COATED ORAL at 08:57

## 2019-12-18 RX ADMIN — POTASSIUM CHLORIDE 20 MEQ: 1500 TABLET, EXTENDED RELEASE ORAL at 14:56

## 2019-12-18 RX ADMIN — HEPARIN SODIUM 5000 UNITS: 5000 INJECTION INTRAVENOUS; SUBCUTANEOUS at 06:15

## 2019-12-18 RX ADMIN — CEFEPIME HYDROCHLORIDE 2000 MG: 2 INJECTION, POWDER, FOR SOLUTION INTRAVENOUS at 15:52

## 2019-12-18 RX ADMIN — CEFEPIME HYDROCHLORIDE 2000 MG: 2 INJECTION, POWDER, FOR SOLUTION INTRAVENOUS at 08:20

## 2019-12-18 NOTE — ASSESSMENT & PLAN NOTE
· Present admission at 2 6 normalized after replete mildly low today at 3 4   · Replete again 20 mEq patient should have repeat BMP with PCP as an outpatient

## 2019-12-18 NOTE — PLAN OF CARE
Problem: Potential for Falls  Goal: Patient will remain free of falls  Description  INTERVENTIONS:  - Assess patient frequently for physical needs  -  Identify cognitive and physical deficits and behaviors that affect risk of falls    -  [place] fall precautions as indicated by assessment   - Educate patient/family on patient safety including physical limitations  - Instruct patient to call for assistance with activity based on assessment  - Modify environment to reduce risk of injury  - Consider OT/PT consult to assist with strengthening/mobility  Outcome: Progressing     Problem: PAIN - ADULT  Goal: Verbalizes/displays adequate comfort level or baseline comfort level  Description  Interventions:  - Encourage patient to monitor pain and request assistance  - Assess pain using appropriate pain scale  - Administer analgesics based on type and severity of pain and evaluate response  - Implement non-pharmacological measures as appropriate and evaluate response  - Consider cultural and social influences on pain and pain management  - Notify physician/advanced practitioner if interventions unsuccessful or patient reports new pain  Outcome: Progressing     Problem: SAFETY ADULT  Goal: Maintain or return to baseline ADL function  Description  INTERVENTIONS:  -  Assess patient's ability to carry out ADLs; assess patient's baseline for ADL function and identify physical deficits which impact ability to perform ADLs (bathing, care of mouth/teeth, toileting, grooming, dressing, etc )  - Assess/evaluate cause of self-care deficits   - Assess range of motion  - Assess patient's mobility; develop plan if impaired  - Assess patient's need for assistive devices and provide as appropriate  - Encourage maximum independence but intervene and supervise when necessary  - Involve family in performance of ADLs  - Assess for home care needs following discharge   - Consider OT consult to assist with ADL evaluation and planning for discharge  - Provide patient education as appropriate  Outcome: Progressing  Goal: Maintain or return mobility status to optimal level  Description  INTERVENTIONS:  - Assess patient's baseline mobility status (ambulation, transfers, stairs, etc )    - Identify cognitive and physical deficits and behaviors that affect mobility  - Identify mobility aids required to assist with transfers and/or ambulation (gait belt, sit-to-stand, lift, walker, cane, etc )  - [place] fall precautions as indicated by assessment  - Record patient progress and toleration of activity level on Mobility SBAR; progress patient to next Phase/Stage  - Instruct patient to call for assistance with activity based on assessment  - Consider rehabilitation consult to assist with strengthening/weightbearing, etc   Outcome: Progressing     Problem: DISCHARGE PLANNING  Goal: Discharge to home or other facility with appropriate resources  Description  INTERVENTIONS:  - Identify barriers to discharge w/patient and caregiver  - Arrange for needed discharge resources and transportation as appropriate  - Identify discharge learning needs (meds, wound care, etc )  - Arrange for interpretive services to assist at discharge as needed  - Refer to Case Management Department for coordinating discharge planning if the patient needs post-hospital services based on physician/advanced practitioner order or complex needs related to functional status, cognitive ability, or social support system  Outcome: Progressing     Problem: Knowledge Deficit  Goal: Patient/family/caregiver demonstrates understanding of disease process, treatment plan, medications, and discharge instructions  Description  Complete learning assessment and assess knowledge base    Interventions:  - Provide teaching at level of understanding  - Provide teaching via preferred learning methods  Outcome: Progressing     Problem: Nutrition/Hydration-ADULT  Goal: Nutrient/Hydration intake appropriate for improving, restoring or maintaining nutritional needs  Description  Monitor and assess patient's nutrition/hydration status for malnutrition  Collaborate with interdisciplinary team and initiate plan and interventions as ordered  Monitor patient's weight and dietary intake as ordered or per policy  Utilize nutrition screening tool and intervene as necessary  Determine patient's food preferences and provide high-protein, high-caloric foods as appropriate       INTERVENTIONS:  - Monitor oral intake, urinary output, labs, and treatment plans  - Assess nutrition and hydration status and recommend course of action  - Evaluate amount of meals eaten  - Assist patient with eating if necessary   - Allow adequate time for meals  - Recommend/ encourage appropriate diets, oral nutritional supplements, and vitamin/mineral supplements  - Order, calculate, and assess calorie counts as needed  - Recommend, monitor, and adjust tube feedings and TPN/PPN based on assessed needs  - Assess need for intravenous fluids  - Provide specific nutrition/hydration education as appropriate  - Include patient/family/caregiver in decisions related to nutrition  Outcome: Progressing

## 2019-12-18 NOTE — ASSESSMENT & PLAN NOTE
· Present admission suspected in setting of pyelonephritis evident by T-max of 103 2° tachypnea 25 neutrophilia 92%    Elevated procalcitonin 95 02/82 88 procalcitonin decreased to 42 17  · Plan as mentioned above

## 2019-12-18 NOTE — ASSESSMENT & PLAN NOTE
· Likely in setting of pyelonephritis, however cannot exclude possible concomitant viral gastroenteritis as patient with multiple recent sick contacts  · Patient family deny any recent hospitalizations or broad-spectrum antibiotic use  · Supportive care at this time  · Symptoms overall clinically resolved

## 2019-12-18 NOTE — DISCHARGE SUMMARY
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  · Fortunately patient is hemodynamically stable and nontoxic appearing at time of my evaluation  · 1 of 2 blood cultures were positive yesterday for gram-negative rods Rocephin was discontinued and patient was initiated on cefepime  · Repeat blood cultures if for growth at 24 hours came back for growth of E coli sensitive cefazolin patient overall medically stable will discharge home and transition to Keflex complete 7 day course  · Patient has remained afebrile for 48 hours and without leukocytosis  · Patient overall at this time clinically cleared for discharge outpatient follow-up with her PCP    Sepsis [facility])  Assessment & Plan  · Present admission suspected in setting of pyelonephritis evident by T-max of 103 2° tachypnea 25 neutrophilia 92%    Elevated procalcitonin 95 02/82 88 procalcitonin decreased to 42 17  · Plan as mentioned above    Hyperglycemia  Assessment & Plan  · Elevated on admission; patient denies history of diabetes  · A1c obtained currently at 6 1  · Likely stress response in setting of illness continue to monitor    Nausea, vomiting and diarrhea  Assessment & Plan  · Likely in setting of pyelonephritis, however cannot exclude possible concomitant viral gastroenteritis as patient with multiple recent sick contacts  · Patient family deny any recent hospitalizations or broad-spectrum antibiotic use  · Supportive care at this time  · Symptoms overall clinically resolved    Hyperlipidemia  Assessment & Plan  · Resume statin upon discharge    Hypokalemia  Assessment & Plan  · Present admission at 2 6 normalized after replete mildly low today at 3 4   · Replete again 20 mEq patient should have repeat BMP with PCP as an outpatient        Discharging Physician / Practitioner: AVEL Saavedra  PCP: No primary care provider on file  Admission Date:   Admission Orders (From admission, onward)     Ordered        12/16/19 1438  Inpatient Admission  Once         12/16/19 0451  Place in Observation  Once                   Discharge Date: 12/18/19    Resolved Problems  Date Reviewed: 12/18/2019    None          Consultations During Hospital Stay:  · None    Procedures Performed:   · Blood cultures x1 12/16    Significant Findings / Test Results:   · Sepsis in setting of UTI bacteremia  · Pyelonephritis    Incidental Findings:   · Hypokalemia     Test Results Pending at Discharge (will require follow up): · Blood cultures x2 2nd set negative for growth at 24:00 hours     Outpatient Tests Requested:  · Repeat BMP    Complications:  None    Reason for Admission:  Pyelonephritis sepsis    Hospital Course:     Kelley Ivan is a 66 y o  female patient who originally presented to the hospital on 12/16/2019 due to 1 day complaint of abdominal pain, nausea, vomiting, and diarrhea with sudden onset 12/15/2019  Patient further has the night before reported fevers and chills patient came to the ED for further evaluation where she is found to have hypokalemia of 2 6 Mag of 1 6 both repleted during the admission  CT of the abdomen pelvis revealed evidence of left kidney pyelonephritis which the patient was initially started on ceftriaxone patient was then transition to more broad-spectrum to cefepime  Him patient had a blood culture come back that was positive for E coli x1 repeats were negative at time of discharge  Patient had a Syble Jest E elevated procalcitonin of 82 18 which came down to 42 07    Patient had an initial fever of 103 1 on admission and remained afebrile for 48 hours  Patient remained without leukocytosis given repeat negative blood cultures patient overall medically feeling better patient was transition to Keflex based on the sensitivity of the blood culture  Changes upon discharge  Keflex 500 mg b i d  For 5 days    Please see above list of diagnoses and related plan for additional information  Condition at Discharge: good     Discharge Day Visit / Exam:     Subjective:  Patient denies fever or chills has no generalized complaints  At bedside with daughter update given and daughter was appreciative and felt her mom was significantly better has denied fever or chills and has generally felt well and understands after discussion the follow-up need with her PCP  Vitals: Blood Pressure: 121/57 (12/18/19 1450)  Pulse: 66 (12/18/19 1450)  Temperature: 98 °F (36 7 °C) (12/18/19 1450)  Temp Source: Oral (12/18/19 1450)  Respirations: 21 (12/18/19 1450)  Height: 5' 2" (157 5 cm) (12/16/19 1621)  Weight - Scale: 66 2 kg (145 lb 15 1 oz) (12/16/19 1621)  SpO2: 97 % (12/18/19 1450)  Exam:   Physical Exam   Constitutional: She is oriented to person, place, and time  She appears well-developed and well-nourished  HENT:   Head: Normocephalic and atraumatic  Eyes: Conjunctivae and EOM are normal    Neck: Normal range of motion  Cardiovascular: Normal rate, regular rhythm and normal heart sounds  Pulmonary/Chest: Effort normal and breath sounds normal    Abdominal: Soft  Bowel sounds are normal    Musculoskeletal: Normal range of motion  Neurological: She is alert and oriented to person, place, and time  Skin: Skin is warm and dry  Psychiatric: She has a normal mood and affect  Her behavior is normal        Discussion with Family:  Daughter at bedside    Discharge instructions/Information to patient and family:   See after visit summary for information provided to patient and family        Provisions for Follow-Up Care:  See after visit summary for information related to follow-up care and any pertinent home health orders  Disposition:     Home    For Discharges to [address] [facility]:   · Not Applicable to this Patient - Not Applicable to this Patient    Planned Readmission:  None     Discharge Statement:  I spent 40 minutes discharging the patient  This time was spent on the day of discharge  I had direct contact with the patient on the day of discharge  Greater than 50% of the total time was spent examining patient, answering all patient questions, arranging and discussing plan of care with patient as well as directly providing post-discharge instructions  Additional time then spent on discharge activities  Discharge Medications:  See after visit summary for reconciled discharge medications provided to patient and family        ** Please Note: This note has been constructed using a voice recognition system **

## 2019-12-21 LAB — BACTERIA BLD CULT: NORMAL

## 2019-12-22 LAB
BACTERIA BLD CULT: NORMAL
BACTERIA BLD CULT: NORMAL

## 2021-02-19 ENCOUNTER — APPOINTMENT (OUTPATIENT)
Dept: MRI IMAGING | Facility: HOSPITAL | Age: 76
End: 2021-02-19
Payer: MEDICARE

## 2021-02-19 ENCOUNTER — APPOINTMENT (EMERGENCY)
Dept: CT IMAGING | Facility: HOSPITAL | Age: 76
End: 2021-02-19
Payer: MEDICARE

## 2021-02-19 ENCOUNTER — HOSPITAL ENCOUNTER (OUTPATIENT)
Facility: HOSPITAL | Age: 76
Setting detail: OBSERVATION
Discharge: HOME | End: 2021-02-20
Attending: EMERGENCY MEDICINE | Admitting: INTERNAL MEDICINE
Payer: MEDICARE

## 2021-02-19 ENCOUNTER — APPOINTMENT (EMERGENCY)
Dept: RADIOLOGY | Facility: HOSPITAL | Age: 76
End: 2021-02-19
Payer: MEDICARE

## 2021-02-19 ENCOUNTER — APPOINTMENT (OUTPATIENT)
Dept: NON INVASIVE DIAGNOSTICS | Facility: HOSPITAL | Age: 76
End: 2021-02-19
Payer: MEDICARE

## 2021-02-19 DIAGNOSIS — R42 DIZZINESS: ICD-10-CM

## 2021-02-19 DIAGNOSIS — R11.2 NAUSEA AND VOMITING: Primary | ICD-10-CM

## 2021-02-19 DIAGNOSIS — R42 VERTIGO: ICD-10-CM

## 2021-02-19 DIAGNOSIS — R51.9 HEADACHE: ICD-10-CM

## 2021-02-19 DIAGNOSIS — E78.5 HYPERLIPIDEMIA, UNSPECIFIED HYPERLIPIDEMIA TYPE: ICD-10-CM

## 2021-02-19 DIAGNOSIS — N39.0 UTI (URINARY TRACT INFECTION): ICD-10-CM

## 2021-02-19 LAB
ALBUMIN SERPL BCP-MCNC: 3.3 G/DL (ref 3.5–5)
ALP SERPL-CCNC: 90 U/L (ref 46–116)
ALT SERPL W P-5'-P-CCNC: 20 U/L (ref 12–78)
ANION GAP SERPL CALCULATED.3IONS-SCNC: 11 MMOL/L (ref 4–13)
AST SERPL W P-5'-P-CCNC: 18 U/L (ref 5–45)
ATRIAL RATE: 73 BPM
BACTERIA UR QL AUTO: ABNORMAL /HPF
BASOPHILS # BLD AUTO: 0.02 THOUSANDS/ΜL (ref 0–0.1)
BASOPHILS NFR BLD AUTO: 0 % (ref 0–1)
BILIRUB SERPL-MCNC: 0.2 MG/DL (ref 0.2–1)
BILIRUB UR QL STRIP: NEGATIVE
BUN SERPL-MCNC: 14 MG/DL (ref 5–25)
CALCIUM ALBUM COR SERPL-MCNC: 9.1 MG/DL (ref 8.3–10.1)
CALCIUM SERPL-MCNC: 8.5 MG/DL (ref 8.3–10.1)
CHLORIDE SERPL-SCNC: 107 MMOL/L (ref 100–108)
CLARITY UR: ABNORMAL
CO2 SERPL-SCNC: 26 MMOL/L (ref 21–32)
COLOR UR: YELLOW
CREAT SERPL-MCNC: 0.85 MG/DL (ref 0.6–1.3)
EOSINOPHIL # BLD AUTO: 0.04 THOUSAND/ΜL (ref 0–0.61)
EOSINOPHIL NFR BLD AUTO: 0 % (ref 0–6)
ERYTHROCYTE [DISTWIDTH] IN BLOOD BY AUTOMATED COUNT: 13.4 % (ref 11.6–15.1)
GFR SERPL CREATININE-BSD FRML MDRD: 66 ML/MIN/1.73SQ M
GLUCOSE SERPL-MCNC: 163 MG/DL (ref 65–140)
GLUCOSE UR STRIP-MCNC: NEGATIVE MG/DL
HCT VFR BLD AUTO: 41.3 % (ref 34.8–46.1)
HGB BLD-MCNC: 13.6 G/DL (ref 11.5–15.4)
HGB UR QL STRIP.AUTO: ABNORMAL
IMM GRANULOCYTES # BLD AUTO: 0.05 THOUSAND/UL (ref 0–0.2)
IMM GRANULOCYTES NFR BLD AUTO: 1 % (ref 0–2)
KETONES UR STRIP-MCNC: NEGATIVE MG/DL
LEUKOCYTE ESTERASE UR QL STRIP: ABNORMAL
LIPASE SERPL-CCNC: 145 U/L (ref 73–393)
LYMPHOCYTES # BLD AUTO: 1.97 THOUSANDS/ΜL (ref 0.6–4.47)
LYMPHOCYTES NFR BLD AUTO: 19 % (ref 14–44)
MCH RBC QN AUTO: 30.8 PG (ref 26.8–34.3)
MCHC RBC AUTO-ENTMCNC: 32.9 G/DL (ref 31.4–37.4)
MCV RBC AUTO: 94 FL (ref 82–98)
MONOCYTES # BLD AUTO: 0.36 THOUSAND/ΜL (ref 0.17–1.22)
MONOCYTES NFR BLD AUTO: 4 % (ref 4–12)
NEUTROPHILS # BLD AUTO: 7.72 THOUSANDS/ΜL (ref 1.85–7.62)
NEUTS SEG NFR BLD AUTO: 76 % (ref 43–75)
NITRITE UR QL STRIP: POSITIVE
NON-SQ EPI CELLS URNS QL MICRO: ABNORMAL /HPF
NRBC BLD AUTO-RTO: 0 /100 WBCS
PH UR STRIP.AUTO: 5 [PH]
PLATELET # BLD AUTO: 200 THOUSANDS/UL (ref 149–390)
PMV BLD AUTO: 11.3 FL (ref 8.9–12.7)
POTASSIUM SERPL-SCNC: 3.3 MMOL/L (ref 3.5–5.3)
PROT SERPL-MCNC: 7.4 G/DL (ref 6.4–8.2)
PROT UR STRIP-MCNC: NEGATIVE MG/DL
QRS AXIS: -25 DEGREES
QRSD INTERVAL: 92 MS
QT INTERVAL: 330 MS
QTC INTERVAL: 361 MS
RBC # BLD AUTO: 4.41 MILLION/UL (ref 3.81–5.12)
RBC #/AREA URNS AUTO: ABNORMAL /HPF
SODIUM SERPL-SCNC: 144 MMOL/L (ref 136–145)
SP GR UR STRIP.AUTO: 1.02 (ref 1–1.03)
T WAVE AXIS: -4 DEGREES
TROPONIN I SERPL-MCNC: <0.02 NG/ML
TSH SERPL DL<=0.05 MIU/L-ACNC: 1.12 UIU/ML (ref 0.36–3.74)
UROBILINOGEN UR QL STRIP.AUTO: 0.2 E.U./DL
VENTRICULAR RATE: 72 BPM
WBC # BLD AUTO: 10.16 THOUSAND/UL (ref 4.31–10.16)
WBC #/AREA URNS AUTO: ABNORMAL /HPF

## 2021-02-19 PROCEDURE — 70450 CT HEAD/BRAIN W/O DYE: CPT

## 2021-02-19 PROCEDURE — 93306 TTE W/DOPPLER COMPLETE: CPT

## 2021-02-19 PROCEDURE — 87040 BLOOD CULTURE FOR BACTERIA: CPT | Performed by: EMERGENCY MEDICINE

## 2021-02-19 PROCEDURE — 83690 ASSAY OF LIPASE: CPT | Performed by: EMERGENCY MEDICINE

## 2021-02-19 PROCEDURE — 80053 COMPREHEN METABOLIC PANEL: CPT | Performed by: EMERGENCY MEDICINE

## 2021-02-19 PROCEDURE — 70551 MRI BRAIN STEM W/O DYE: CPT

## 2021-02-19 PROCEDURE — 93306 TTE W/DOPPLER COMPLETE: CPT | Performed by: INTERNAL MEDICINE

## 2021-02-19 PROCEDURE — 81001 URINALYSIS AUTO W/SCOPE: CPT | Performed by: EMERGENCY MEDICINE

## 2021-02-19 PROCEDURE — 85025 COMPLETE CBC W/AUTO DIFF WBC: CPT | Performed by: EMERGENCY MEDICINE

## 2021-02-19 PROCEDURE — 99220 PR INITIAL OBSERVATION CARE/DAY 70 MINUTES: CPT | Performed by: INTERNAL MEDICINE

## 2021-02-19 PROCEDURE — 93005 ELECTROCARDIOGRAM TRACING: CPT

## 2021-02-19 PROCEDURE — 84443 ASSAY THYROID STIM HORMONE: CPT | Performed by: NURSE PRACTITIONER

## 2021-02-19 PROCEDURE — G1004 CDSM NDSC: HCPCS

## 2021-02-19 PROCEDURE — 99205 OFFICE O/P NEW HI 60 MIN: CPT | Performed by: PSYCHIATRY & NEUROLOGY

## 2021-02-19 PROCEDURE — 93010 ELECTROCARDIOGRAM REPORT: CPT | Performed by: INTERNAL MEDICINE

## 2021-02-19 PROCEDURE — 84484 ASSAY OF TROPONIN QUANT: CPT | Performed by: EMERGENCY MEDICINE

## 2021-02-19 PROCEDURE — 87077 CULTURE AEROBIC IDENTIFY: CPT | Performed by: EMERGENCY MEDICINE

## 2021-02-19 PROCEDURE — 99285 EMERGENCY DEPT VISIT HI MDM: CPT | Performed by: EMERGENCY MEDICINE

## 2021-02-19 PROCEDURE — 99285 EMERGENCY DEPT VISIT HI MDM: CPT

## 2021-02-19 PROCEDURE — 87186 SC STD MICRODIL/AGAR DIL: CPT | Performed by: EMERGENCY MEDICINE

## 2021-02-19 PROCEDURE — 87086 URINE CULTURE/COLONY COUNT: CPT | Performed by: EMERGENCY MEDICINE

## 2021-02-19 PROCEDURE — 96360 HYDRATION IV INFUSION INIT: CPT

## 2021-02-19 PROCEDURE — 1124F ACP DISCUSS-NO DSCNMKR DOCD: CPT | Performed by: EMERGENCY MEDICINE

## 2021-02-19 PROCEDURE — 36415 COLL VENOUS BLD VENIPUNCTURE: CPT | Performed by: EMERGENCY MEDICINE

## 2021-02-19 PROCEDURE — 71046 X-RAY EXAM CHEST 2 VIEWS: CPT

## 2021-02-19 RX ORDER — MECLIZINE HYDROCHLORIDE 25 MG/1
25 TABLET ORAL ONCE
Status: COMPLETED | OUTPATIENT
Start: 2021-02-19 | End: 2021-02-19

## 2021-02-19 RX ORDER — ONDANSETRON 2 MG/ML
4 INJECTION INTRAMUSCULAR; INTRAVENOUS ONCE
Status: COMPLETED | OUTPATIENT
Start: 2021-02-19 | End: 2021-02-19

## 2021-02-19 RX ORDER — ASPIRIN 81 MG/1
81 TABLET, CHEWABLE ORAL DAILY
Status: DISCONTINUED | OUTPATIENT
Start: 2021-02-19 | End: 2021-02-20 | Stop reason: HOSPADM

## 2021-02-19 RX ORDER — HEPARIN SODIUM 5000 [USP'U]/ML
5000 INJECTION, SOLUTION INTRAVENOUS; SUBCUTANEOUS EVERY 8 HOURS SCHEDULED
Status: DISCONTINUED | OUTPATIENT
Start: 2021-02-19 | End: 2021-02-20 | Stop reason: HOSPADM

## 2021-02-19 RX ORDER — ATORVASTATIN CALCIUM 40 MG/1
40 TABLET, FILM COATED ORAL EVERY EVENING
Status: DISCONTINUED | OUTPATIENT
Start: 2021-02-19 | End: 2021-02-20 | Stop reason: HOSPADM

## 2021-02-19 RX ADMIN — MECLIZINE HYDROCHLORIDE 25 MG: 25 TABLET ORAL at 05:51

## 2021-02-19 RX ADMIN — ONDANSETRON 4 MG: 2 INJECTION INTRAMUSCULAR; INTRAVENOUS at 07:22

## 2021-02-19 RX ADMIN — HEPARIN SODIUM 5000 UNITS: 5000 INJECTION INTRAVENOUS; SUBCUTANEOUS at 09:00

## 2021-02-19 RX ADMIN — SODIUM CHLORIDE 1000 ML: 0.9 INJECTION, SOLUTION INTRAVENOUS at 05:50

## 2021-02-19 RX ADMIN — CEFTRIAXONE SODIUM 1000 MG: 10 INJECTION, POWDER, FOR SOLUTION INTRAVENOUS at 08:15

## 2021-02-19 RX ADMIN — ASPIRIN 81 MG: 81 TABLET, CHEWABLE ORAL at 08:57

## 2021-02-19 RX ADMIN — HEPARIN SODIUM 5000 UNITS: 5000 INJECTION INTRAVENOUS; SUBCUTANEOUS at 17:28

## 2021-02-19 RX ADMIN — ATORVASTATIN CALCIUM 40 MG: 40 TABLET, FILM COATED ORAL at 17:28

## 2021-02-19 RX ADMIN — HEPARIN SODIUM 5000 UNITS: 5000 INJECTION INTRAVENOUS; SUBCUTANEOUS at 21:22

## 2021-02-19 NOTE — PLAN OF CARE
Problem: Potential for Falls  Goal: Patient will remain free of falls  Description: INTERVENTIONS:  - Assess patient frequently for physical needs  -  Identify cognitive and physical deficits and behaviors that affect risk of falls    -  [place] fall precautions as indicated by assessment   - Educate patient/family on patient safety including physical limitations  - Instruct patient to call for assistance with activity based on assessment  - Modify environment to reduce risk of injury  - Consider OT/PT consult to assist with strengthening/mobility  Outcome: Progressing     Problem: PAIN - ADULT  Goal: Verbalizes/displays adequate comfort level or baseline comfort level  Description: Interventions:  - Encourage patient to monitor pain and request assistance  - Assess pain using appropriate pain scale  - Administer analgesics based on type and severity of pain and evaluate response  - Implement non-pharmacological measures as appropriate and evaluate response  - Consider cultural and social influences on pain and pain management  - Notify physician/advanced practitioner if interventions unsuccessful or patient reports new pain  Outcome: Progressing     Problem: INFECTION - ADULT  Goal: Absence or prevention of progression during hospitalization  Description: INTERVENTIONS:  - Assess and monitor for signs and symptoms of infection  - Monitor lab/diagnostic results  - Monitor all insertion sites, i e  indwelling lines, tubes, and drains  - Monitor endotracheal if appropriate and nasal secretions for changes in amount and color  - [place] appropriate cooling/warming therapies per order  - Administer medications as ordered  - Instruct and encourage patient and family to use good hand hygiene technique  - Identify and instruct in appropriate isolation precautions for identified infection/condition  Outcome: Progressing  Goal: Absence of fever/infection during neutropenic period  Description: INTERVENTIONS:  - Monitor WBC    Outcome: Progressing     Problem: SAFETY ADULT  Goal: Patient will remain free of falls  Description: INTERVENTIONS:  - Assess patient frequently for physical needs  -  Identify cognitive and physical deficits and behaviors that affect risk of falls    -  [place] fall precautions as indicated by assessment   - Educate patient/family on patient safety including physical limitations  - Instruct patient to call for assistance with activity based on assessment  - Modify environment to reduce risk of injury  - Consider OT/PT consult to assist with strengthening/mobility  Outcome: Progressing  Goal: Maintain or return to baseline ADL function  Description: INTERVENTIONS:  -  Assess patient's ability to carry out ADLs; assess patient's baseline for ADL function and identify physical deficits which impact ability to perform ADLs (bathing, care of mouth/teeth, toileting, grooming, dressing, etc )  - Assess/evaluate cause of self-care deficits   - Assess range of motion  - Assess patient's mobility; develop plan if impaired  - Assess patient's need for assistive devices and provide as appropriate  - Encourage maximum independence but intervene and supervise when necessary  - Involve family in performance of ADLs  - Assess for home care needs following discharge   - Consider OT consult to assist with ADL evaluation and planning for discharge  - Provide patient education as appropriate  Outcome: Progressing  Goal: Maintain or return mobility status to optimal level  Description: INTERVENTIONS:  - Assess patient's baseline mobility status (ambulation, transfers, stairs, etc )    - Identify cognitive and physical deficits and behaviors that affect mobility  - Identify mobility aids required to assist with transfers and/or ambulation (gait belt, sit-to-stand, lift, walker, cane, etc )  - [place] fall precautions as indicated by assessment  - Record patient progress and toleration of activity level on Mobility SBAR; progress patient to next Phase/Stage  - Instruct patient to call for assistance with activity based on assessment  - Consider rehabilitation consult to assist with strengthening/weightbearing, etc   Outcome: Progressing     Problem: DISCHARGE PLANNING  Goal: Discharge to home or other facility with appropriate resources  Description: INTERVENTIONS:  - Identify barriers to discharge w/patient and caregiver  - Arrange for needed discharge resources and transportation as appropriate  - Identify discharge learning needs (meds, wound care, etc )  - Arrange for interpretive services to assist at discharge as needed  - Refer to Case Management Department for coordinating discharge planning if the patient needs post-hospital services based on physician/advanced practitioner order or complex needs related to functional status, cognitive ability, or social support system  Outcome: Progressing     Problem: Knowledge Deficit  Goal: Patient/family/caregiver demonstrates understanding of disease process, treatment plan, medications, and discharge instructions  Description: Complete learning assessment and assess knowledge base    Interventions:  - Provide teaching at level of understanding  - Provide teaching via preferred learning methods  Outcome: Progressing

## 2021-02-19 NOTE — ASSESSMENT & PLAN NOTE
· Unclear etiology at this time  Stroke work up pending  Will also check MRV due to abnormal findings noted on CTH  · On rosuvastatin at baseline, aspirin naive  · Acute onset of dizziness with associated nausea, vomiting, headache  · BP on presentation 177/79  · Non focal neuro exam    Plan:  Stroke pathway  MRI brain/MRV  Echo pending  Lipid Panel  Hemoglobin A1c  Aspirin 81 mg   Atorvastatin 40 mg  SBP goal 140-180; avoid hypotension  Continue telemetry  PT/OT/ST  Frequent neuro checks  Continue to monitor and notify neurology with any changes  · Blood cultures pending  · Medical management and supportive care per primary team  Correction of any metabolic or infectious disturbances  Results:  · CT head:  · Ill-defined areas of decreased attenuation in the occipital lobes bilaterally, left greater than right  Findings likely represent subacute infarction  Also in the differential, however less likely, would be PRES or superior sagittal sinus thrombosis  · Cerebral atrophy with chronic small vessel ischemic white matter disease    · UA: Small leukocytes, positive nitrite, trace blood  · Urine microscopic: 10-20 WBC, occasional bacteria  · TSH 1 124

## 2021-02-19 NOTE — CASE MANAGEMENT
LOS 1 DAY  PATIENT CLASS IS OBS  PATIENT IS NOT A READMISSION  PATIENT IS NOT A BUNDLE  CM met with patient and daughter at bedside to complete CM open and discuss discharge planning  CM consult for ischemic stroke pathway  Patient states that she lives in [address] is a second story apartment with a full flight of steps to the apartment and [address] the building  She lives with her daughter Charlie Reyes  [place] family  She has no DME and states that she is independent in ADL:s and Mobility  Daughter helps her with IADLS  She has no rehab hx IP or OP and has never needed a HHC  She uses [facility] in [address] for Rx locally but gets her meds from the clinic at [facility]   She uses the clinic as her PCP  Because of covid she has not been seen at the clinic since 2019, but the have been renewing her HTN meds  Patient does not have a POA, but her daughter Charlie Reyes is her HCR  She does not work and she does not drive  Family provides transportation and her JOSE E is outside ready to take her home  CM reviewed discharge planning process including the following: identifying caregivers at home, preference for d/c planning needs,  availability of treatment team to discuss questions or concerns patient and/or family may have regarding diagnosis, plan of care, old or new medications and discharge planning   CM will continue to follow for care coordination and update assessment as appropriate  Pt is a 65 yo female with Vertigo, neuro checks, MRI Brain, Echo, tele, PT/OT REC PENDING  PTA pt independent no DME, Lives in [facility] with family, visiting for 2 weeks, DCP is home with family REC TBD  JOSE E will transport home

## 2021-02-19 NOTE — ED NOTES
Pt to restroom in wheelchair with daughter  ua spec  Collected        Griselda Parson, RN  02/19/21 0805

## 2021-02-19 NOTE — H&P
H&P- [place] 1945, 66 y o  female MRN: [id number]    Unit/Bed#: ED 32 Encounter: [id number]    Primary Care Provider: No primary care provider on file  Date and time admitted to hospital: 2/19/2021  5:30 AM        * Vertigo  Assessment & Plan  58-year-old female presented with acute vertigo that occurred overnight causing her to have nausea and vomiting  No neuro deficits on exam  CT scan revealed " Ill-defined areas of decreased attenuation in the occipital lobes bilaterally, left side greater than right  Findings likely represent subacute infarction  Also in the differential, however less likely would be PRES (posterior reversible encephalopathy syndrome) or superior sagittal sinus thrombosis"  Recommended MRI possibly MRV  Given concern for stroke will admit under stroke pathway  Neurology consult   Will obtain MRI brain  Echo  Continuous tele  PT/OT  Neurochecks      Hyperlipidemia  Assessment & Plan  Continue statin        VTE Prophylaxis: Heparin  / sequential compression device   Code Status: full code  POLST: There is no POLST form on file for this patient (pre-hospital)  Discussion with family: pt    Anticipated Length of Stay:  Patient will be admitted on an Observation basis with an anticipated length of stay of  < 2 midnights  Justification for Hospital Stay:  Vertigo    Total Time for Visit, including Counseling / Coordination of Care: 1 hour  Greater than 50% of this total time spent on direct patient counseling and coordination of care  Chief Complaint:   Dizziness    History of Present Illness:    [place] is a 66 y o  female past medical history significant hyperlipidemia who initially presented with acute onset dizziness  Patient reports in the middle the night she had acute onset dizziness with associated nausea and vomiting and associated headache  She reports the headache is on the posterior aspect    She reports a history of vertigo in the distant past   She otherwise denies any acute symptoms  She denies numbness, weakness, slurred speech, blurred vision, confusion, diarrhea, constipation, abdominal pain, chest pain, shortness breath, palpitations, cough or any other symptoms       Review of Systems:    Review of Systems   Constitutional: Negative for activity change, appetite change, chills, diaphoresis, fever and unexpected weight change  HENT: Negative for congestion, facial swelling and rhinorrhea  Eyes: Negative for photophobia and visual disturbance  Respiratory: Negative for cough, shortness of breath and wheezing  Cardiovascular: Negative for chest pain and palpitations  Gastrointestinal: Negative for abdominal pain, blood in stool, constipation, diarrhea, nausea and vomiting  Genitourinary: Negative for decreased urine volume, difficulty urinating, dysuria, flank pain, frequency, hematuria and urgency  Musculoskeletal: Negative for arthralgias, back pain, joint swelling and myalgias  Neurological: Positive for dizziness and headaches  Negative for syncope, facial asymmetry, light-headedness and numbness  Psychiatric/Behavioral: Negative for confusion and decreased concentration  The patient is not nervous/anxious  Past Medical and Surgical History:     Past Medical History:   Diagnosis Date    Hyperlipidemia     Renal disorder        Past Surgical History:   Procedure Laterality Date    KIDNEY SURGERY      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 ago        Meds/Allergies:    Prior to Admission medications    Medication Sig Start Date End Date Taking? Authorizing Provider   rosuvastatin (CRESTOR) 10 MG tablet Take 10 mg by mouth daily    Historical Provider, MD     I have reviewed home medications with patient family member  Allergies:    Allergies   Allergen Reactions    Iodine Other (See Comments)     fainting       Social History:     Marital Status: /Civil Union     Patient Pre-hospital Living Situation: home  Patient Pre-hospital Level of Mobility: independent  Patient Pre-hospital Diet Restrictions: none  Substance Use History:   Social History     Substance and Sexual Activity   Alcohol Use Never    Frequency: Never     Social History     Tobacco Use   Smoking Status Never Smoker   Smokeless Tobacco Never Used     Social History     Substance and Sexual Activity   Drug Use Never       Family History:    History reviewed  No pertinent family history  Physical Exam:     Vitals:   Blood Pressure: 143/65 (02/19/21 0630)  Pulse: 70 (02/19/21 0630)  Temperature: 98 °F (36 7 °C) (02/19/21 0538)  Temp Source: Oral (02/19/21 0538)  Respirations: (!) 30 (02/19/21 0630)  Height: 5' 2" (157 5 cm) (02/19/21 0538)  Weight - Scale: 66 8 kg (147 lb 4 3 oz) (02/19/21 0538)  SpO2: 95 % (02/19/21 0630)    Physical Exam  Constitutional:       General: She is not in acute distress  Appearance: She is well-developed  She is not diaphoretic  HENT:      Head: Normocephalic and atraumatic  Nose: Nose normal       Mouth/Throat:      Pharynx: No oropharyngeal exudate  Eyes:      General: No scleral icterus  Right eye: No discharge  Left eye: No discharge  Conjunctiva/sclera: Conjunctivae normal    Neck:      Musculoskeletal: Normal range of motion and neck supple  Thyroid: No thyromegaly  Vascular: No JVD  Cardiovascular:      Rate and Rhythm: Normal rate and regular rhythm  Heart sounds: Normal heart sounds  No murmur  No friction rub  No gallop  Pulmonary:      Effort: Pulmonary effort is normal  No respiratory distress  Breath sounds: Normal breath sounds  No wheezing or rales  Chest:      Chest wall: No tenderness  Abdominal:      General: Bowel sounds are normal  There is no distension  Palpations: Abdomen is soft  Tenderness: There is no abdominal tenderness  There is no guarding or rebound  Musculoskeletal: Normal range of motion           General: No tenderness or deformity  Skin:     General: Skin is warm and dry  Findings: No erythema or rash  Neurological:      Mental Status: She is alert  Mental status is at baseline  Cranial Nerves: No cranial nerve deficit  Sensory: No sensory deficit  Motor: No abnormal muscle tone  Coordination: Coordination normal            Additional Data:     Lab Results: I have personally reviewed pertinent reports  Results from last 7 days   Lab Units 02/19/21  0553   WBC Thousand/uL 10 16   HEMOGLOBIN g/dL 13 6   HEMATOCRIT % 41 3   PLATELETS Thousands/uL 200   NEUTROS PCT % 76*   LYMPHS PCT % 19   MONOS PCT % 4   EOS PCT % 0     Results from last 7 days   Lab Units 02/19/21  0553   SODIUM mmol/L 144   POTASSIUM mmol/L 3 3*   CHLORIDE mmol/L 107   CO2 mmol/L 26   BUN mg/dL 14   CREATININE mg/dL 0 85   ANION GAP mmol/L 11   CALCIUM mg/dL 8 5   ALBUMIN g/dL 3 3*   TOTAL BILIRUBIN mg/dL 0 20   ALK PHOS U/L 90   ALT U/L 20   AST U/L 18   GLUCOSE RANDOM mg/dL 163*                       Imaging: I have personally reviewed pertinent reports  XR chest 2 views   Final Result by Constance Wilson MD (02/19 0387)      No acute cardiopulmonary disease  Workstation performed: [id number]         CT head without contrast   Final Result by Yuni Cuellar DO (02/19 5938)   1  Ill-defined areas of decreased attenuation in the occipital lobes bilaterally, left side greater than right  Findings likely represent subacute infarction  Also in the differential, however less likely would be PRES (posterior reversible    encephalopathy syndrome) or superior sagittal sinus thrombosis  2   Cerebral atrophy with chronic small vessel ischemic white matter disease  Follow-up neurology consultation recommended  Consider follow-up MRI of the brain with diffusion-weighted sequencing and MRV                     I personally discussed this study with Lesly Delgado on 2/19/2021 at 6:21 AM  Workstation performed: [id number]             EKG, Pathology, and Other Studies Reviewed on Admission:   · EKG: reviewed    Allscripts / Epic Records Reviewed: Yes     ** Please Note: This note has been constructed using a voice recognition system   **

## 2021-02-19 NOTE — ASSESSMENT & PLAN NOTE
68-year-old female presented with acute vertigo that occurred overnight causing her to have nausea and vomiting  No neuro deficits on exam  CT scan revealed " Ill-defined areas of decreased attenuation in the occipital lobes bilaterally, left side greater than right  Findings likely represent subacute infarction    Also in the differential, however less likely would be PRES (posterior reversible encephalopathy syndrome) or superior sagittal sinus thrombosis"  Recommended MRI possibly MRV  Given concern for stroke will admit under stroke pathway  Neurology consult   Will obtain MRI brain  Echo  Continuous tele  PT/OT  Neurochecks

## 2021-02-19 NOTE — ED PROVIDER NOTES
History  Chief Complaint   Patient presents with    Vomiting     Patient presents with n/v pt states woke up 330am this morning with dizziness that caused her to have vomitting, pt has hx of vertigo and denies hx of HTN      66 y o  F presents to the ED with dizziness and vomiting that started in the middle of the night, waking her up  She has a headache, posterior  Mild photophobia  She has dizziness as well  She denies visual field changes, but does admit to some visual waviness  She does not exhibit focal neuro deficits  Normal strength in UE and LE B/L  She has a hx of vertigo in the past, but this was approx 25 years ago and she does not remember if this is what her vertigo felt like  She denies a Hx of migraines  She also denies a hx of HTN, but had elevated BP on arrival - quickly improved once she was settled in the ED  Prior to Admission Medications   Prescriptions Last Dose Informant Patient Reported? Taking? rosuvastatin (CRESTOR) 10 MG tablet Not Taking at Unknown time Self Yes No   Sig: Take 10 mg by mouth daily      Facility-Administered Medications: None       Past Medical History:   Diagnosis Date    Hyperlipidemia     Renal disorder        Past Surgical History:   Procedure Laterality Date    KIDNEY SURGERY      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 ago        Family History   Problem Relation Age of Onset    No Known Problems Mother     No Known Problems Father      I have reviewed and agree with the history as documented  E-Cigarette/Vaping     E-Cigarette/Vaping Substances     Social History     Tobacco Use    Smoking status: Never Smoker    Smokeless tobacco: Never Used   Substance Use Topics    Alcohol use: Never     Frequency: Never    Drug use: Never       Review of Systems   Constitutional: Negative for chills, fatigue and fever  HENT: Negative for congestion and sore throat  Eyes: Positive for visual disturbance (wavy vision)  Negative for photophobia and pain  Respiratory: Negative for apnea, cough, chest tightness and shortness of breath  Cardiovascular: Negative for chest pain and palpitations  Gastrointestinal: Positive for nausea and vomiting  Negative for abdominal pain, constipation and diarrhea  Genitourinary: Negative for dysuria and flank pain  Musculoskeletal: Negative for back pain, neck pain and neck stiffness  Skin: Negative for color change and rash  Allergic/Immunologic: Negative for immunocompromised state  Neurological: Positive for dizziness and light-headedness  Negative for seizures, syncope, facial asymmetry, speech difficulty, weakness, numbness and headaches  Psychiatric/Behavioral: Negative for confusion  Physical Exam  Physical Exam  Vitals signs reviewed  Constitutional:       General: She is not in acute distress  Appearance: She is well-developed  She is not diaphoretic  HENT:      Head: Normocephalic and atraumatic  Right Ear: Tympanic membrane normal       Left Ear: Tympanic membrane normal    Eyes:      General: No scleral icterus  Right eye: No discharge  Left eye: No discharge  Extraocular Movements: Extraocular movements intact  Conjunctiva/sclera: Conjunctivae normal       Pupils: Pupils are equal, round, and reactive to light  Neck:      Musculoskeletal: Normal range of motion and neck supple  No neck rigidity  Vascular: No JVD  Cardiovascular:      Rate and Rhythm: Normal rate and regular rhythm  Heart sounds: Normal heart sounds  No murmur  No friction rub  No gallop  Pulmonary:      Effort: Pulmonary effort is normal  No respiratory distress  Breath sounds: Normal breath sounds  No wheezing or rales  Chest:      Chest wall: No tenderness  Abdominal:      General: Bowel sounds are normal  There is no distension  Palpations: Abdomen is soft  Tenderness: There is no abdominal tenderness   There is no right CVA tenderness, left CVA tenderness, guarding or rebound  Musculoskeletal: Normal range of motion  General: No tenderness or deformity  Right lower leg: No edema  Left lower leg: No edema  Skin:     General: Skin is warm and dry  Coloration: Skin is not pale  Findings: No erythema or rash  Neurological:      General: No focal deficit present  Mental Status: She is alert and oriented to person, place, and time  Cranial Nerves: No cranial nerve deficit  Sensory: No sensory deficit  Comments:  GCS 15  AAOx3  No focal neuro deficits  CN II-XII grossly intact  Speech normal, no aphasia or dysarthria  No pronator drift  Cerebellar function normal  PERRL  EOMI  Peripheral vision intact  No nystagmus  Upper and lower extremity strength 5/5 through   strength 5/5 b/l  Gross sensation intact b/l       Psychiatric:         Behavior: Behavior normal          Vital Signs  ED Triage Vitals [02/19/21 0538]   Temperature Pulse Respirations Blood Pressure SpO2   98 °F (36 7 °C) 74 18 (!) 177/79 98 %      Temp Source Heart Rate Source Patient Position - Orthostatic VS BP Location FiO2 (%)   Oral Monitor Lying Right arm --      Pain Score       8           Vitals:    02/19/21 1500 02/19/21 1700 02/19/21 1900 02/19/21 2309   BP: 147/68 152/72 135/54 147/70   Pulse: 57 66  58   Patient Position - Orthostatic VS: Lying Lying Lying          Visual Acuity  Visual Acuity      Most Recent Value   L Pupil Size (mm)  3   R Pupil Size (mm)  3   L Pupil Shape  Round   R Pupil Shape  Round          ED Medications  Medications   atorvastatin (LIPITOR) tablet 40 mg (40 mg Oral Given 2/19/21 1728)   aspirin chewable tablet 81 mg (81 mg Oral Given 2/19/21 0857)   heparin (porcine) subcutaneous injection 5,000 Units (5,000 Units Subcutaneous Given 2/19/21 2122)   sodium chloride 0 9 % bolus 1,000 mL (0 mL Intravenous Stopped 2/19/21 1019)   meclizine (ANTIVERT) tablet 25 mg (25 mg Oral Given 2/19/21 0551)   ceftriaxone (ROCEPHIN) 1 g/50 mL in dextrose IVPB (0 mg Intravenous Stopped 2/19/21 0845)   ondansetron (ZOFRAN) injection 4 mg (4 mg Intravenous Given 2/19/21 0722)       Diagnostic Studies  Results Reviewed     Procedure Component Value Units Date/Time    Blood culture [[id number]] Collected: 02/19/21 8532    Lab Status: Preliminary result Specimen: Blood from Arm, Left Updated: 02/19/21 2101     Blood Culture Received in Microbiology Lab  Culture in Progress  Blood culture [[id number]] Collected: 02/19/21 1894    Lab Status: Preliminary result Specimen: Blood from Arm, Left Updated: 02/19/21 2101     Blood Culture Received in Microbiology Lab  Culture in Progress  TSH, 3rd generation with Free T4 reflex [[id number]]  (Normal) Collected: 02/19/21 0558    Lab Status: Final result Specimen: Blood from Arm, Left Updated: 02/19/21 1146     TSH 3RD GENERATON 1 124 uIU/mL     Narrative:      Patients undergoing fluorescein dye angiography may retain small amounts of fluorescein in the body for 48-72 hours post procedure  Samples containing fluorescein can produce falsely depressed TSH values  If the patient had this procedure,a specimen should be resubmitted post fluorescein clearance        UA w Reflex to Microscopic w Reflex to Culture [[id number]]  (Abnormal) Collected: 02/19/21 0653    Lab Status: Final result Specimen: Urine Updated: 02/19/21 0710     Color, UA Yellow     Clarity, UA Slightly Cloudy     Specific [place], UA 1 020     pH, UA 5 0     Leukocytes, UA Small     Nitrite, UA Positive     Protein, UA Negative mg/dl      Glucose, UA Negative mg/dl      Ketones, UA Negative mg/dl      Urobilinogen, UA 0 2 E U /dl      Bilirubin, UA Negative     Blood, UA Trace-Intact    Urine Microscopic [[id number]]  (Abnormal) Collected: 02/19/21 0653    Lab Status: Final result Specimen: Urine Updated: 02/19/21 0709     RBC, UA 0-1 /hpf      WBC, UA 10-20 /hpf      Epithelial Cells None Seen /hpf      Bacteria, UA Occasional /hpf Urine culture [[id number]] Collected: 02/19/21 0653    Lab Status:  In process Specimen: Urine Updated: 02/19/21 0709    Troponin I [[id number]]  (Normal) Collected: 02/19/21 0553    Lab Status: Final result Specimen: Blood from Arm, Left Updated: 02/19/21 0629     Troponin I <0 02 ng/mL     Comprehensive metabolic panel [[id number]]  (Abnormal) Collected: 02/19/21 0553    Lab Status: Final result Specimen: Blood from Arm, Left Updated: 02/19/21 6531     Sodium 144 mmol/L      Potassium 3 3 mmol/L      Chloride 107 mmol/L      CO2 26 mmol/L      ANION GAP 11 mmol/L      BUN 14 mg/dL      Creatinine 0 85 mg/dL      Glucose 163 mg/dL      Calcium 8 5 mg/dL      Corrected Calcium 9 1 mg/dL      AST 18 U/L      ALT 20 U/L      Alkaline Phosphatase 90 U/L      Total Protein 7 4 g/dL      Albumin 3 3 g/dL      Total Bilirubin 0 20 mg/dL      eGFR 66 ml/min/1 73sq m     Narrative:      [place] guidelines for Chronic Kidney Disease (CKD):     Stage 1 with normal or high GFR (GFR > 90 mL/min/1 73 square meters)    Stage 2 Mild CKD (GFR = 60-89 mL/min/1 73 square meters)    Stage 3A Moderate CKD (GFR = 45-59 mL/min/1 73 square meters)    Stage 3B Moderate CKD (GFR = 30-44 mL/min/1 73 square meters)    Stage 4 Severe CKD (GFR = 15-29 mL/min/1 73 square meters)    Stage 5 End Stage CKD (GFR <15 mL/min/1 73 square meters)  Note: GFR calculation is accurate only with a steady state creatinine    Lipase [[id number]]  (Normal) Collected: 02/19/21 0558    Lab Status: Final result Specimen: Blood from Arm, Left Updated: 02/19/21 0620     Lipase 145 u/L     CBC and differential [[id number]]  (Abnormal) Collected: 02/19/21 0553    Lab Status: Final result Specimen: Blood from Arm, Left Updated: 02/19/21 0602     WBC 10 16 Thousand/uL      RBC 4 41 Million/uL      Hemoglobin 13 6 g/dL      Hematocrit 41 3 %      MCV 94 fL      MCH 30 8 pg      MCHC 32 9 g/dL      RDW 13 4 %      MPV 11 3 fL      Platelets 088 Thousands/uL      nRBC 0 /100 WBCs      Neutrophils Relative 76 %      Immat GRANS % 1 %      Lymphocytes Relative 19 %      Monocytes Relative 4 %      Eosinophils Relative 0 %      Basophils Relative 0 %      Neutrophils Absolute 7 72 Thousands/µL      Immature Grans Absolute 0 05 Thousand/uL      Lymphocytes Absolute 1 97 Thousands/µL      Monocytes Absolute 0 36 Thousand/µL      Eosinophils Absolute 0 04 Thousand/µL      Basophils Absolute 0 02 Thousands/µL                  MRV head wo contrast   Final Result by Raffy Marie MD (02/19 1542)      No evidence of dural venous sinus thrombosis  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  Workstation performed: [id number]         MRI brain wo contrast   Final Result by Raffy Marie MD (02/19 1537)      No acute intracranial abnormality  Mild nonspecific frontal white matter signal abnormality which can be seen in patients with migraines  Alternatively, white matter signal abnormality may be secondary to chronic microangiopathic disease  Workstation performed: [id number]         XR chest 2 views   Final Result by Odette Fitzgerald MD (02/19 9908)      No acute cardiopulmonary disease  Workstation performed: [id number]         CT head without contrast   Final Result by Brooks Patel DO (02/19 3059)   1  Ill-defined areas of decreased attenuation in the occipital lobes bilaterally, left side greater than right  Findings likely represent subacute infarction  Also in the differential, however less likely would be PRES (posterior reversible    encephalopathy syndrome) or superior sagittal sinus thrombosis  2   Cerebral atrophy with chronic small vessel ischemic white matter disease  Follow-up neurology consultation recommended  Consider follow-up MRI of the brain with diffusion-weighted sequencing and MRV                     I personally discussed this study with [employer] on 2/19/2021 at 6:21 AM                            Workstation performed: [id number]                    Procedures  ECG 12 Lead Documentation Only    Date/Time: 2/19/2021 5:52 AM  Performed by: Chely Duarte DO  Authorized by: Chely Duarte DO     Indications / Diagnosis:  Dizziness  ECG reviewed by me, the ED Provider: yes    Patient location:  ED  Previous ECG:     Previous ECG:  Compared to current    Comparison ECG info:  December 2019    Similarity:  No change    Comparison to cardiac monitor: Yes    Interpretation:     Interpretation: non-specific    Rate:     ECG rate:  72    ECG rate assessment: normal    Rhythm:     Rhythm: sinus rhythm    Ectopy:     Ectopy: none    QRS:     QRS axis:  Normal    QRS intervals:  Normal  Conduction:     Conduction: normal    ST segments:     ST segments:  Normal  T waves:     T waves: non-specific               ED Course  ED Course as of Feb 20 0248 Fri Feb 19, 2021   7913 Pt agreeable to admission  1997 Blood Pressure: 131/63   0633 Patient feels slightly improved after antivert  Will attempt migraine cocktail      0701 TT sent to [facility] ([facility]) for admission      32 82 12 UTI - will get blood cultures and start on ABX   Nitrite, UA(!): Positive               Identification of Seniors at Risk      Most Recent Value   (ISAR) Identification of Seniors at Risk   Before the illness or injury that brought you to the Emergency, did you need someone to help you on a regular basis? 0 Filed at: 02/19/2021 0541   In the last 24 hours, have you needed more help than usual?  0 Filed at: 02/19/2021 0541   Have you been hospitalized for one or more nights during the past 6 months? 0 Filed at: 02/19/2021 0541   In general, do you see well?  0 Filed at: 02/19/2021 0541   In general, do you have serious problems with your memory?   0 Filed at: 02/19/2021 0541   Do you take more than three different medications every day?  0 Filed at: 02/19/2021 0541   ISAR Score  0 Filed at: 02/19/2021 0541                                  [facility]  Number of Diagnoses or Management Options  Dizziness:   Headache:   Nausea and vomiting:   UTI (urinary tract infection):   Diagnosis management comments: Dizziness and headache  Most concerning for vertigo versus migraine headache  However concern for posterior circulation stroke or other intracranial abnormality  A CT scan of her brain is concerning for bilateral occipital lesions concerning for subacute stroke, pres, or sinus venous thrombosis  Patient will require admission for MRI/MRV, neuro consult, for further evaluation of intracranial abnormality if any  Patient agreeable to admission  Feels improved after treatments provided here  Also found to have UTI - started treatment w rocephin  Amount and/or Complexity of Data Reviewed  Clinical lab tests: ordered and reviewed  Tests in the radiology section of CPT®: ordered and reviewed        Disposition  Final diagnoses:   Nausea and vomiting   UTI (urinary tract infection)   Dizziness   Headache     Time reflects when diagnosis was documented in both MDM as applicable and the Disposition within this note     Time User Action Codes Description Comment    2/19/2021  7:15 AM Coppersmith, Susan Earnest L Add [R11 2] Nausea and vomiting     2/19/2021  7:15 AM Coppersmith, Susan Earnest L Add [N39 0] UTI (urinary tract infection)     2/19/2021  7:15 AM Coppersmith, Susan Earnest L Add [R42] Dizziness     2/19/2021  7:16 AM CoppersmithFrancisca Add [R51 9] Headache     2/19/2021  8:15 AM Inetta Labella Add [R42] Vertigo       ED Disposition     ED Disposition Condition Date/Time Comment    Admit Stable Fri Feb 19, 2021  7:15 AM Case was discussed with Fernando ([facility]) and the patient's admission status was agreed to be Admission Status: observation status to the service of Dr Martine Machado ([facility])           Follow-up Information     Follow up With Specialties Details Why Wilber Lewis Call in 1 day(s)            Current Discharge Medication List      CONTINUE these medications which have NOT CHANGED    Details   rosuvastatin (CRESTOR) 10 MG tablet Take 10 mg by mouth daily           No discharge procedures on file      PDMP Review     None          ED Provider  Electronically Signed by           Marisol Barbour DO  02/20/21 4407

## 2021-02-19 NOTE — CONSULTS
Consultation - Neurology   Mick Gonzalez 66 y o  female MRN: [id number]  Unit/Bed#: ED 32 Encounter: [id number]      Assessment/Plan   67 y/o female with HLD, hx renal disorder s/p right nephrectomy, who presents with dizziness, nausea, vomiting, headache  * Vertigo  Assessment & Plan  · Unclear etiology at this time  Stroke work up pending  Will also check MRV due to abnormal findings noted on CTH  · On rosuvastatin at baseline, aspirin naive  · Acute onset of dizziness with associated nausea, vomiting, headache  · BP on presentation 177/79  · Non focal neuro exam    Plan:  Stroke pathway  MRI brain/MRV  Echo pending  Lipid Panel  Hemoglobin A1c  Aspirin 81 mg   Atorvastatin 40 mg  SBP goal 140-180; avoid hypotension  Continue telemetry  PT/OT/ST  Frequent neuro checks  Continue to monitor and notify neurology with any changes  · Blood cultures pending  · Medical management and supportive care per primary team  Correction of any metabolic or infectious disturbances  Results:  · CT head:  · Ill-defined areas of decreased attenuation in the occipital lobes bilaterally, left greater than right  Findings likely represent subacute infarction  Also in the differential, however less likely, would be PRES or superior sagittal sinus thrombosis  · Cerebral atrophy with chronic small vessel ischemic white matter disease  · UA: Small leukocytes, positive nitrite, trace blood  · Urine microscopic: 10-20 WBC, occasional bacteria  · TSH 1 124    Hyperlipidemia  Assessment & Plan  · On rosuvastatin at baseline  · Continue atorvastatin 40 mg  · Lipid panel pending      Recommendations for outpatient neurological follow up have yet to be determined  Case and treatment plan reviewed with attending neurologist, Dr Hillary Chamorro        History of Present Illness     Reason for Consult / Principal Problem: Vertigo  Hx and PE limited by: [place] speaking  HPI: Mick Gonzalez is a 66 y o   female with HLD, hx renal disorder s/p right nephrectomy, who presents with dizziness, nausea, vomiting, headache  Per H&P review, patient presented with acute onset of dizziness  Patient reports having sudden onset of symptoms with associated nausea, vomiting, and headache  She notes the headache is located posteriorly  She does have a history of vertigo in the past     On exam, patient is mainly [place] speaking and daughter is at bedside to help translate  Patient report waking up early this morning to use the bathroom  When she went to get up, she had sudden onset of left posterior headache, described as pounding  She went to get up from the bed and had to sit back down as she felt like she was off balanced, falling to the left  She had to hold onto things to walk to her daughter's bedroom to let her know that she was not feeling well  Patient also reports having nausea, vomiting, doubled vision, and blurred vision  She also reports having bilateral posterior neck pain (L>R) but notes this has been ongoing for a while  She does have a history of vertigo  Daughter states that the patient has not been evaluated in the hospital for vertigo for approximately 25 years now  Patient does report having an episode of vertigo a few days ago but notes today's episode was worse and lasted longer as her usual symptoms only last for a few seconds and then spontaneously resolve  At this time, patient is laying in the stretcher  She reports still having the left sided headache but the visual changes, nausea, vomiting have resolved  She still has some dizziness but reports it is improved, although, it does worsen if she tries to get up  Inpatient consult to Neurology  Consult performed by: AVEL Swenson  Consult ordered by: Tono Mitchell MD        Review of Systems   Constitutional: Negative for fatigue  Eyes: Positive for visual disturbance  Negative for photophobia  Respiratory: Negative for shortness of breath      Cardiovascular: Negative for chest pain    Gastrointestinal: Positive for abdominal pain, diarrhea and vomiting  Musculoskeletal: Positive for gait problem and neck pain  Negative for arthralgias, back pain and myalgias  Neurological: Positive for dizziness and headaches  Negative for tremors, seizures, syncope, facial asymmetry, speech difficulty, weakness, light-headedness and numbness  Psychiatric/Behavioral: Negative for confusion and hallucinations  All other systems reviewed and are negative  Historical Information   Past Medical History:   Diagnosis Date    Hyperlipidemia     Renal disorder      Past Surgical History:   Procedure Laterality Date    KIDNEY SURGERY      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 ago      Social History   Social History     Substance and Sexual Activity   Alcohol Use Never    Frequency: Never     Social History     Substance and Sexual Activity   Drug Use Never     E-Cigarette/Vaping     E-Cigarette/Vaping Substances     Social History     Tobacco Use   Smoking Status Never Smoker   Smokeless Tobacco Never Used     Family History:   Family History   Problem Relation Age of Onset    No Known Problems Mother     No Known Problems Father        Review of previous medical records was completed  Meds/Allergies   all current active meds have been reviewed, current meds:   Current Facility-Administered Medications   Medication Dose Route Frequency    aspirin chewable tablet 81 mg  81 mg Oral Daily    atorvastatin (LIPITOR) tablet 40 mg  40 mg Oral QPM    heparin (porcine) subcutaneous injection 5,000 Units  5,000 Units Subcutaneous Q8H [facility]    and PTA meds:   Prior to Admission Medications   Prescriptions Last Dose Informant Patient Reported? Taking?    rosuvastatin (CRESTOR) 10 MG tablet Not Taking at Unknown time Self Yes No   Sig: Take 10 mg by mouth daily      Facility-Administered Medications: None       Allergies   Allergen Reactions    Iodine Other (See Comments)     fainting       Objective Vitals:Blood pressure 118/59, pulse 62, temperature 98 °F (36 7 °C), temperature source Oral, resp  rate 20, height 5' 2" (1 575 m), weight 66 8 kg (147 lb 4 3 oz), SpO2 96 %  ,Body mass index is 26 94 kg/m²  Intake/Output Summary (Last 24 hours) at 2/19/2021 1339  Last data filed at 2/19/2021 1019  Gross per 24 hour   Intake 1050 ml   Output --   Net 1050 ml       Invasive Devices: Invasive Devices     Peripheral Intravenous Line            Peripheral IV 02/19/21 less than 1 day                Physical Exam  Vitals signs and nursing note reviewed  Constitutional:       General: She is not in acute distress  Appearance: Normal appearance  She is normal weight  She is not ill-appearing  HENT:      Head: Normocephalic  Mouth/Throat:      Mouth: Mucous membranes are moist       Pharynx: Oropharynx is clear  Eyes:      General: No scleral icterus  Right eye: No discharge  Left eye: No discharge  Extraocular Movements: Extraocular movements intact and EOM normal       Conjunctiva/sclera: Conjunctivae normal       Pupils: Pupils are equal, round, and reactive to light  Neck:      Musculoskeletal: Normal range of motion  Cardiovascular:      Rate and Rhythm: Normal rate  Pulmonary:      Effort: Pulmonary effort is normal  No respiratory distress  Musculoskeletal: Normal range of motion  Skin:     General: Skin is warm and dry  Coloration: Skin is not jaundiced or pale  Neurological:      Mental Status: She is alert and oriented to person, place, and time  Coordination: Finger-Nose-Finger Test normal       Deep Tendon Reflexes:      Reflex Scores:       Bicep reflexes are 2+ on the right side and 2+ on the left side  Brachioradialis reflexes are 2+ on the right side and 2+ on the left side  Patellar reflexes are 2+ on the right side and 2+ on the left side    Psychiatric:         Mood and Affect: Mood normal          Speech: Speech normal  Behavior: Behavior normal          Thought Content: Thought content normal          Judgment: Judgment normal        Neurologic Exam     Mental Status   Oriented to person, place, and time  Attention: normal  Concentration: normal    Speech: speech is normal   Level of consciousness: alert  AAO x4  Able to follow commands appropriately  No dysarthria noted  Cranial Nerves     CN II   Right visual field deficit: none  Left visual field deficit: none     CN III, IV, VI   Pupils are equal, round, and reactive to light  Extraocular motions are normal    Right pupil: Shape: regular  Left pupil: Shape: regular  Nystagmus: none     CN V   Facial sensation intact  CN VII   Facial expression full, symmetric  CN VIII   CN VIII normal      CN XI   CN XI normal      CN XII   CN XII normal      Motor Exam   Muscle bulk: normal  Right arm pronator drift: absent  Left arm pronator drift: absent  Bilateral upper extremity strength 5/5 deltoids, biceps, triceps, hand   Bilateral lower extremity strength 5/5 hip flexion, dorsiflexion, plantar flexion     Sensory Exam   Light touch normal    Vibration normal    Equal sensation to temperature in bilateral upper and lower extremities     Gait, Coordination, and Reflexes     Coordination   Finger to nose coordination: normal    Tremor   Resting tremor: absent  Intention tremor: absent    Reflexes   Right brachioradialis: 2+  Left brachioradialis: 2+  Right biceps: 2+  Left biceps: 2+  Right patellar: 2+  Left patellar: 2+  Right plantar: normal  Left plantar: equivocal      Lab Results:   I have personally reviewed pertinent reports    , CBC:   Results from last 7 days   Lab Units 02/19/21  0553   WBC Thousand/uL 10 16   RBC Million/uL 4 41   HEMOGLOBIN g/dL 13 6   HEMATOCRIT % 41 3   MCV fL 94   PLATELETS Thousands/uL 200   , BMP/CMP:   Results from last 7 days   Lab Units 02/19/21  0553   SODIUM mmol/L 144   POTASSIUM mmol/L 3 3*   CHLORIDE mmol/L 107   CO2 mmol/L 26   BUN mg/dL 14   CREATININE mg/dL 0 85   CALCIUM mg/dL 8 5   AST U/L 18   ALT U/L 20   ALK PHOS U/L 90   EGFR ml/min/1 73sq m 66   , Vitamin B12:   , HgBA1C:   , TSH:   Results from last 7 days   Lab Units 02/19/21  0558   TSH 3RD GENERATON uIU/mL 1 124   , Coagulation:   , Lipid Profile:   , Ammonia:   , Urinalysis:   Results from last 7 days   Lab Units 02/19/21  0653   COLOR UA  Yellow   CLARITY UA  Slightly Cloudy   SPEC GRAV UA  1 020   PH UA  5 0   LEUKOCYTES UA  Small*   NITRITE UA  Positive*   GLUCOSE UA mg/dl Negative   KETONES UA mg/dl Negative   BILIRUBIN UA  Negative   BLOOD UA  Trace-Intact*   , Drug Screen:   , Medication Drug Levels:       Invalid input(s): CARBAMAZEPINE,  PHENOBARB, LACOSAMIDE, OXCARBAZEPINE         Imaging Studies: I have personally reviewed pertinent reports  EKG, Pathology, and Other Studies: I have personally reviewed pertinent reports      VTE Prophylaxis: Sequential compression device (Venodyne)  and Heparin

## 2021-02-20 VITALS
WEIGHT: 147.27 LBS | SYSTOLIC BLOOD PRESSURE: 146 MMHG | HEIGHT: 62 IN | HEART RATE: 52 BPM | TEMPERATURE: 97.8 F | BODY MASS INDEX: 27.1 KG/M2 | DIASTOLIC BLOOD PRESSURE: 71 MMHG | OXYGEN SATURATION: 95 % | RESPIRATION RATE: 16 BRPM

## 2021-02-20 LAB
ANION GAP SERPL CALCULATED.3IONS-SCNC: 10 MMOL/L (ref 4–13)
BUN SERPL-MCNC: 13 MG/DL (ref 5–25)
CALCIUM SERPL-MCNC: 8.4 MG/DL (ref 8.3–10.1)
CHLORIDE SERPL-SCNC: 110 MMOL/L (ref 100–108)
CHOLEST SERPL-MCNC: 223 MG/DL (ref 50–200)
CO2 SERPL-SCNC: 26 MMOL/L (ref 21–32)
CREAT SERPL-MCNC: 0.92 MG/DL (ref 0.6–1.3)
ERYTHROCYTE [DISTWIDTH] IN BLOOD BY AUTOMATED COUNT: 13.6 % (ref 11.6–15.1)
EST. AVERAGE GLUCOSE BLD GHB EST-MCNC: 120 MG/DL
GFR SERPL CREATININE-BSD FRML MDRD: 60 ML/MIN/1.73SQ M
GLUCOSE SERPL-MCNC: 98 MG/DL (ref 65–140)
HBA1C MFR BLD: 5.8 %
HCT VFR BLD AUTO: 38.2 % (ref 34.8–46.1)
HDLC SERPL-MCNC: 48 MG/DL
HGB BLD-MCNC: 12.4 G/DL (ref 11.5–15.4)
LDLC SERPL CALC-MCNC: 146 MG/DL (ref 0–100)
MCH RBC QN AUTO: 30.8 PG (ref 26.8–34.3)
MCHC RBC AUTO-ENTMCNC: 32.5 G/DL (ref 31.4–37.4)
MCV RBC AUTO: 95 FL (ref 82–98)
PLATELET # BLD AUTO: 171 THOUSANDS/UL (ref 149–390)
PMV BLD AUTO: 11.4 FL (ref 8.9–12.7)
POTASSIUM SERPL-SCNC: 3.4 MMOL/L (ref 3.5–5.3)
RBC # BLD AUTO: 4.02 MILLION/UL (ref 3.81–5.12)
SODIUM SERPL-SCNC: 146 MMOL/L (ref 136–145)
TRIGL SERPL-MCNC: 147 MG/DL
WBC # BLD AUTO: 5.93 THOUSAND/UL (ref 4.31–10.16)

## 2021-02-20 PROCEDURE — 80061 LIPID PANEL: CPT | Performed by: INTERNAL MEDICINE

## 2021-02-20 PROCEDURE — 85027 COMPLETE CBC AUTOMATED: CPT | Performed by: INTERNAL MEDICINE

## 2021-02-20 PROCEDURE — 97163 PT EVAL HIGH COMPLEX 45 MIN: CPT

## 2021-02-20 PROCEDURE — 83036 HEMOGLOBIN GLYCOSYLATED A1C: CPT | Performed by: INTERNAL MEDICINE

## 2021-02-20 PROCEDURE — 99217 PR OBSERVATION CARE DISCHARGE MANAGEMENT: CPT | Performed by: NURSE PRACTITIONER

## 2021-02-20 PROCEDURE — 80048 BASIC METABOLIC PNL TOTAL CA: CPT | Performed by: INTERNAL MEDICINE

## 2021-02-20 RX ORDER — NITROFURANTOIN 25; 75 MG/1; MG/1
100 CAPSULE ORAL 2 TIMES DAILY WITH MEALS
Qty: 10 CAPSULE | Refills: 0 | Status: SHIPPED | OUTPATIENT
Start: 2021-02-20 | End: 2021-02-25

## 2021-02-20 RX ORDER — MECLIZINE HCL 12.5 MG/1
12.5 TABLET ORAL EVERY 8 HOURS PRN
Status: DISCONTINUED | OUTPATIENT
Start: 2021-02-20 | End: 2021-02-20 | Stop reason: HOSPADM

## 2021-02-20 RX ORDER — ATORVASTATIN CALCIUM 40 MG/1
40 TABLET, FILM COATED ORAL EVERY EVENING
Qty: 30 TABLET | Refills: 0 | Status: SHIPPED | OUTPATIENT
Start: 2021-02-20 | End: 2021-03-22

## 2021-02-20 RX ORDER — ASPIRIN 81 MG/1
81 TABLET, CHEWABLE ORAL DAILY
Qty: 30 TABLET | Refills: 0 | Status: SHIPPED | OUTPATIENT
Start: 2021-02-21 | End: 2021-03-23

## 2021-02-20 RX ORDER — MECLIZINE HCL 12.5 MG/1
12.5 TABLET ORAL EVERY 8 HOURS PRN
Qty: 30 TABLET | Refills: 0 | Status: SHIPPED | OUTPATIENT
Start: 2021-02-20

## 2021-02-20 RX ORDER — NITROFURANTOIN 25; 75 MG/1; MG/1
100 CAPSULE ORAL 2 TIMES DAILY WITH MEALS
Status: DISCONTINUED | OUTPATIENT
Start: 2021-02-20 | End: 2021-02-20 | Stop reason: HOSPADM

## 2021-02-20 RX ADMIN — HEPARIN SODIUM 5000 UNITS: 5000 INJECTION INTRAVENOUS; SUBCUTANEOUS at 06:12

## 2021-02-20 RX ADMIN — ASPIRIN 81 MG: 81 TABLET, CHEWABLE ORAL at 08:54

## 2021-02-20 NOTE — UTILIZATION REVIEW
Initial Clinical Review    Admission: Date/Time/Statement:   Admission Orders (From admission, onward)     Ordered        02/19/21 0716  Place in Observation  Once                   Orders Placed This Encounter   Procedures    Place in Observation     Standing Status:   Standing     Number of Occurrences:   1     Order Specific Question:   Level of Care     Answer:   Med Surg [16]     ED Arrival Information     Expected Arrival Acuity Means of Arrival Escorted By Service Admission Type    - 2/19/2021 05:24 Urgent Wheelchair Family Member General Medicine Urgent    Arrival Complaint    vomiting        Chief Complaint   Patient presents with    Vomiting     Patient presents with n/v pt states woke up 330am this morning with dizziness that caused her to have vomitting, pt has hx of vertigo and denies hx of HTN            HPI:   66 y o  female past medical history significant hyperlipidemia who initially presented with acute onset dizziness  Patient reports in the middle the night she had acute onset dizziness with associated nausea and vomiting and associated headache  She reports the headache is on the posterior aspect  She reports a history of vertigo in the distant past   She otherwise denies any acute symptoms  She denies numbness, weakness, slurred speech, blurred vision, confusion, diarrhea, constipation, abdominal pain, chest pain, shortness breath, palpitations, cough or any other symptoms        * Vertigo  Assessment & Plan  47-year-old female presented with acute vertigo that occurred overnight causing her to have nausea and vomiting  No neuro deficits on exam  CT scan revealed " Ill-defined areas of decreased attenuation in the occipital lobes bilaterally, left side greater than right   Findings likely represent subacute infarction   Also in the differential, however less likely would be PRES (posterior reversible encephalopathy syndrome) or superior sagittal sinus thrombosis"  Recommended MRI possibly MRV  Given concern for stroke will admit under stroke pathway  Neurology consult   Will obtain MRI brain  Echo  Continuous tele  PT/OT  Neurochecks        Hyperlipidemia  Assessment & Plan  Continue statin           VTE Prophylaxis: Heparin  / sequential compression device   Code Status: full code  POLST: There is no POLST form on file for this patient (pre-hospital)  Discussion with family: pt     Anticipated Length of Stay:  Patient will be admitted on an Observation basis with an anticipated length of stay of  < 2 midnights  Justification for Hospital Stay:  Vertigo    2/19 Neurology consult:  Attestation signed by Lila Jaimes MD at 2/20/2021 12:40 AM     This morning she developed sudden onset of horizontal double vision that lasted a few minutes and then eyes became blurry and then this resolved after a few minutes  She had a rocking sensation that led to spinning worse on to the left when walking  She threw up is feeling lot better now  Currently only when she is turning her head either way are moving that she feel a bit of nausea  Initially she came in systolic blood pressure is 170s likely from discomfort currently to 1 teens  No nystagmus on exam   Slight dysmetria left upper extremity left lower extremity slower on exam   She has no baseline neck issues currently has myofascial exam posterior neck and left-sided neck discomfort  She mentions transient right upper extremity weakness a few days ago when she tried to grab a glass in the kitchen  At this point strong suspicion for mild acute peripheral vestibulopathy particularly a mild BPPV  No clinical evidence of a labyrinthitis, neuronitis, or Meiniere's  Although intracranial and extracranial cerebrovascular imaging not obtained, lower suspicion for posterior circulation TIA  Stronger suspicion double vision related to her vertiginous symptoms that she experienced  Denies any focal weakness or numbness nor lightheadedness    No additional neurological recommendations  Neurology team will sign off please call with questions or concerns  Consultation - Neurology   Mick Gonzalez 66 y o  female MRN: [id number]  Unit/Bed#: ED 27 Encounter: [id number]        Assessment/Plan   67 y/o female with HLD, hx renal disorder s/p right nephrectomy, who presents with dizziness, nausea, vomiting, headache      * Vertigo  Assessment & Plan  · Unclear etiology at this time  Stroke work up pending  Will also check MRV due to abnormal findings noted on CTH  · On rosuvastatin at baseline, aspirin naive  · Acute onset of dizziness with associated nausea, vomiting, headache  · BP on presentation 177/79  · Non focal neuro exam     Plan:  · Stroke pathway  · MRI brain/MRV  · Echo pending  · Lipid Panel  · Hemoglobin A1c  · Aspirin 81 mg   · Atorvastatin 40 mg  · SBP goal 140-180; avoid hypotension  · Continue telemetry  · PT/OT/ST  · Frequent neuro checks  Continue to monitor and notify neurology with any changes  · Blood cultures pending  · Medical management and supportive care per primary team  Correction of any metabolic or infectious disturbances       Results:  · CT head:  · Ill-defined areas of decreased attenuation in the occipital lobes bilaterally, left greater than right  Findings likely represent subacute infarction  Also in the differential, however less likely, would be PRES or superior sagittal sinus thrombosis  · Cerebral atrophy with chronic small vessel ischemic white matter disease    · UA: Small leukocytes, positive nitrite, trace blood  · Urine microscopic: 10-20 WBC, occasional bacteria  · TSH 1 124     Hyperlipidemia  Assessment & Plan  · On rosuvastatin at baseline  · Continue atorvastatin 40 mg  · Lipid panel pending        Recommendations for outpatient neurological follow up have yet to be determined       Case and treatment plan reviewed with attending neurologist, Dr Hillary Chamorro        ED Triage Vitals [02/19/21 0538]   Temperature Pulse Respirations Blood Pressure SpO2   98 °F (36 7 °C) 74 18 (!) 177/79 98 %      Temp Source Heart Rate Source Patient Position - Orthostatic VS BP Location FiO2 (%)   Oral Monitor Lying Right arm --      Pain Score       8          Wt Readings from Last 1 Encounters:   02/19/21 66 8 kg (147 lb 4 3 oz)     Additional Vital Signs:       Date/Time  Temp  Pulse  Resp  BP  MAP   SpO2  O2 Device    02/20/21 06:56:59  97 9 °F (36 6 °C)  54Abnormal   16  136/67  90  96 %  --    02/20/21 04:58:43  97 8 °F (36 6 °C)  70  --  --  --  94 %  --    02/20/21 02:49:32  97 3 °F (36 3 °C)Abnormal   55  --  131/69  90  96 %  --    02/19/21 23:09:07  97 8 °F (36 6 °C)  58  --  147/70  96  95 %  --    02/19/21 2011  --  --  --  --  --  --  None (Room air)    02/19/21 1900  97 8 °F (36 6 °C)  --  17  135/54  --  --  None (Room air)    02/19/21 1700  98 °F (36 7 °C)  66  --  152/72  --  97 %  None (Room air)    02/19/21 1500  98 1 °F (36 7 °C)  57  --  147/68  --  97 %  None (Room air)    02/19/21 1300  --  63  17  130/62  --  96 %  None (Room air)    02/19/21 1215  --  62  20  118/59  --  96 %  None (Room air)    02/19/21 1115  --  67  19  134/62  100  96 %  None (Room air)    02/19/21 1015  --  65  20  138/64  92  96 %  None (Room air)    02/19/21 0915  --  66  19  140/64  --  96 %  None (Room air)    02/19/21 0815  --  64  21  137/65  93  96 %  None (Room air)    02/19/21 0630  --  70  30Abnormal   143/65  94  95 %  --    02/19/21 0615  --  70  29Abnormal   131/63  90  96 %  --      Date and Time Eye Opening Best Verbal Response Best Motor Response [place] Coma Scale Score   02/19/21 2011 4 5 6 15   02/19/21 1900 4 5 6 15   02/19/21 1500 4 5 6 15   02/19/21 1300 4 5 6 15   02/19/21 1215 4 5 6 15   02/19/21 1115 4 5 6 15   02/19/21 1015 4 5 6 15   02/19/21 0915 4 5 6 15   02/19/21 0815 4 5 6 15         Pertinent Labs/Diagnostic Test Results:     2/19 MRV head: No evidence of dural venous sinus thrombosis   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  2/19 MRI brain: No acute intracranial abnormality  Mild nonspecific frontal white matter signal abnormality which can be seen in patients with migraines  Alternatively, white matter signal abnormality may be secondary to chronic microangiopathic disease  2/19 CT head:  No acute intraparenchymal hemorrhage or extra-axial fluid collections    Ill-defined areas of decreased attenuation in the occipital lobes bilaterally, left side greater than right    Decreased attenuation in the periventricular regions and centrum semiovale bilaterally      Minimal calcifications of the supraclinoid internal carotid arteries bilaterally  2/19 CXR:   No acute cardiopulmonary disease  2/19 ECHO:      LEFT VENTRICLE:  Systolic function was normal  Ejection fraction was estimated to be 60 %  There were no regional wall motion abnormalities  Doppler parameters were consistent with abnormal left ventricular relaxation (grade 1 diastolic dysfunction)      MITRAL VALVE:  There was trace regurgitation      AORTIC VALVE:  There was mild regurgitation      TRICUSPID VALVE:  There was trace regurgitation  Pulmonary artery systolic pressure was within the normal range      PULMONIC VALVE:  There was trace regurgitation        2/19 EKG:      Sinus rhythm  Nonspecific ST and T wave abnormality  Abnormal ECG  When compared with ECG of 16-DEC-2019 02:22,  Nonspecific T wave abnormality now evident in Inferior leads  Nonspecific T wave abnormality now evident in Lateral       Results from last 7 days   Lab Units 02/20/21  0451 02/19/21  0553   WBC Thousand/uL 5 93 10 16   HEMOGLOBIN g/dL 12 4 13 6   HEMATOCRIT % 38 2 41 3   PLATELETS Thousands/uL 171 200   NEUTROS ABS Thousands/µL  --  7 72*         Results from last 7 days   Lab Units 02/20/21  0451 02/19/21  0553   SODIUM mmol/L 146* 144   POTASSIUM mmol/L 3 4* 3 3*   CHLORIDE mmol/L 110* 107   CO2 mmol/L 26 26   ANION GAP mmol/L 10 11   BUN mg/dL 13 14   CREATININE mg/dL 0 92 0 85   EGFR ml/min/1 73sq m 60 66   CALCIUM mg/dL 8 4 8 5     Results from last 7 days   Lab Units 02/19/21  0553   AST U/L 18   ALT U/L 20   ALK PHOS U/L 90   TOTAL PROTEIN g/dL 7 4   ALBUMIN g/dL 3 3*   TOTAL BILIRUBIN mg/dL 0 20         Results from last 7 days   Lab Units 02/20/21  0451 02/19/21  0553   GLUCOSE RANDOM mg/dL 98 163*             Results from last 7 days   Lab Units 02/19/21  0553   TROPONIN I ng/mL <0 02             Results from last 7 days   Lab Units 02/19/21  0558   TSH 3RD GENERATON uIU/mL 1 124                                 Results from last 7 days   Lab Units 02/19/21  0558   LIPASE u/L 145             Results from last 7 days   Lab Units 02/19/21  0653   CLARITY UA  Slightly Cloudy   COLOR UA  Yellow   SPEC GRAV UA  1 020   PH UA  5 0   GLUCOSE UA mg/dl Negative   KETONES UA mg/dl Negative   BLOOD UA  Trace-Intact*   PROTEIN UA mg/dl Negative   NITRITE UA  Positive*   BILIRUBIN UA  Negative   UROBILINOGEN UA E U /dl 0 2   LEUKOCYTES UA  Small*   WBC UA /hpf 10-20*   RBC UA /hpf 0-1   BACTERIA UA /hpf Occasional   EPITHELIAL CELLS WET PREP /hpf None Seen                                 Results from last 7 days   Lab Units 02/19/21  0811 02/19/21  0806   BLOOD CULTURE  Received in Microbiology Lab  Culture in Progress  Received in Microbiology Lab  Culture in Progress                 ED Treatment:   Medication Administration from 02/19/2021 0524 to 02/19/2021 1420       Date/Time Order Dose Route Action Action by Comments     02/19/2021 1019 sodium chloride 0 9 % bolus 1,000 mL 0 mL Intravenous Stopped Sana Hoffmann RN      02/19/2021 0550 sodium chloride 0 9 % bolus 1,000 mL 1,000 mL Intravenous Lida 37 Frances Fritz RN      02/19/2021 0551 meclizine (ANTIVERT) tablet 25 mg 25 mg Oral Given Frances Fritz RN      02/19/2021 0845 ceftriaxone (ROCEPHIN) 1 g/50 mL in dextrose IVPB 0 mg Intravenous [facility]      02/19/2021 0981 ceftriaxone (ROCEPHIN) 1 g/50 mL in dextrose IVPB 1,000 mg Intravenous [place], [address]      02/19/2021 0722 ondansetron (ZOFRAN) injection 4 mg 4 mg Intravenous Given Royal Delgado RN      02/19/2021 6451 aspirin chewable tablet 81 mg 81 mg Oral Given Royal Delgado RN      02/19/2021 0900 heparin (porcine) subcutaneous injection 5,000 Units 5,000 Units Subcutaneous Given Royal Delgado RN         Past Medical History:   Diagnosis Date    Hyperlipidemia     Renal disorder      Present on Admission:   Hyperlipidemia      Admitting Diagnosis: Dizziness [R42]  Vertigo [R42]  Vomiting [R11 10]  UTI (urinary tract infection) [N39 0]  Nausea and vomiting [R11 2]  Headache [R51 9]  Age/Sex: 66 y o  female       Admission Orders:    Telemetry, SCD, PT/OT, Baseline NIH stroke scale on admission, reassess Q24H x 2 D, Nursing dysphagia assessment prior to staring diet, neuro checks  Scheduled Medications:  aspirin, 81 mg, Oral, Daily  atorvastatin, 40 mg, Oral, QPM  heparin (porcine), 5,000 Units, Subcutaneous, Q8H [address]      Continuous IV Infusions:     PRN Meds:       IP CONSULT TO NEUROLOGY  IP CONSULT TO CASE MANAGEMENT    Network Utilization Review Department  ATTENTION: Please call with any questions or concerns to [phone] and carefully listen to the prompts so that you are directed to the right person  All voicemails are confidential   Clemencia Carpenter all requests for admission clinical reviews, approved or denied determinations and any other requests to dedicated fax number below belonging to the campus where the patient is receiving treatment   List of dedicated fax numbers for the Facilities:  [address] DENIALS (Administrative/Medical Necessity) [phone]   [address] [place] [address] (Maternity/NICU/Pediatrics) 261 [facility],[address] [place] Roman Caleb 1277 (Radha Merida) [address] [place] [address] James Fernandez [facility] [phone]   [place] Kylie Lott[address] [phone]

## 2021-02-20 NOTE — CASE MANAGEMENT
Cm received TT from PT reporting that they are recommending OP PT/vestibular  Cm department will continue to follow patient through discharge

## 2021-02-20 NOTE — DISCHARGE INSTRUCTIONS
Vertigo   WHAT YOU NEED TO KNOW:   Vertigo is a condition that causes you to feel dizzy  You may feel that you or everything around you is moving or spinning  You may also feel like you are being pulled down or toward your side  DISCHARGE INSTRUCTIONS:   Return to the emergency department if:   · You have a headache and a stiff neck  · You have shaking chills and a fever  · You vomit over and over with no relief  · You have blood, pus, or fluid coming out of your ears  · You are confused  Contact your healthcare provider if:   · Your symptoms do not get better with treatment  · You have questions about your condition or care  Medicines:   · Medicine  may be given to help relieve your symptoms  · Take your medicine as directed  Contact your healthcare provider if you think your medicine is not helping or if you have side effects  Tell him or her if you are allergic to any medicine  Keep a list of the medicines, vitamins, and herbs you take  Include the amounts, and when and why you take them  Bring the list or the pill bottles to follow-up visits  Carry your medicine list with you in case of an emergency  Manage your symptoms:   · Do not drive , walk without help, or operate heavy machinery when you are dizzy  · Move slowly  when you move from one position to another position  Get up slowly from sitting or lying down  Sit or lie down right away if you feel dizzy  · Drink plenty of liquids  Liquids help prevent dehydration  Ask how much liquid to drink each day and which liquids are best for you  · Vestibular and balance rehabilitation therapy (VBRT)  is used to teach you exercises to improve your balance and strength  These exercises may help decrease your vertigo and improve your balance  Ask for more information about this therapy  Follow up with your healthcare provider as directed:  Write down your questions so you remember to ask them during your visits     © Copyright [address] Information is for [employer] use only and may not be sold, redistributed or otherwise used for commercial purposes  All illustrations and images included in CareNotes® are the copyrighted property of [employer]  or [employer]  The above information is an  only  It is not intended as medical advice for individual conditions or treatments  Talk to your doctor, nurse or pharmacist before following any medical regimen to see if it is safe and effective for you  Urinary Tract Infection in Older Adults   WHAT YOU NEED TO KNOW:   A urinary tract infection (UTI) is caused by bacteria that get inside your urinary tract  Your urinary tract includes your kidneys, ureters, bladder, and urethra  Urine is made in your kidneys, and it flows from the ureters to the bladder  Urine leaves the bladder through the urethra  A UTI is more common in your lower urinary tract, which includes your bladder and urethra  DISCHARGE INSTRUCTIONS:   Return to the emergency department if:   · You are urinating very little or not at all  · You are vomiting  · You have a high fever with shaking chills  · You have side or back pain that gets worse  Call your doctor if:   · You have a fever  · You are a woman and you have increased white or yellow discharge from your vagina  · You do not feel better after 2 days of taking antibiotics  · You have questions or concerns about your condition or care  Medicines:   · Medicines  help treat the bacterial infection or decrease pain and burning when you urinate  You may also need medicines to decrease the urge to urinate often  If you have UTIs often (called recurrent UTIs), you may be given antibiotics to take regularly  You will be given directions for when and how to use antibiotics  The goal is to prevent UTIs but not cause antibiotic resistance by using antibiotics too often  · Take your medicine as directed  Contact your healthcare provider if you think your medicine is not helping or if you have side effects  Tell him or her if you are allergic to any medicine  Keep a list of the medicines, vitamins, and herbs you take  Include the amounts, and when and why you take them  Bring the list or the pill bottles to follow-up visits  Carry your medicine list with you in case of an emergency  Self-care:   · Drink liquids as directed  Liquids can help flush bacteria from your urinary tract  Ask how much liquid to drink each day and which liquids are best for you  You may need to drink more liquids than usual to help flush out the bacteria  Do not drink alcohol, caffeine, and citrus juices  These can irritate your bladder and increase your symptoms  · Apply heat  on your abdomen for 20 to 30 minutes every 2 hours for as many days as directed  Heat helps decrease discomfort and pressure in your bladder  Prevent a UTI:   · Urinate when you feel the urge  Do not hold your urine  Bacteria can grow if urine stays in the bladder too long  It may be helpful to urinate at least every 3 to 4 hours  · Urinate after you have sex  to flush away bacteria that can enter your urinary tract during sex  · Wear cotton underwear and clothes that are loose  Tight pants and nylon underwear can trap moisture and cause bacteria to grow  · Cranberry juice or cranberry supplements  may help prevent UTIs  Your healthcare provider can recommend the right juice or supplement for you  · Women should wipe front to back  after urinating or having a bowel movement  This may prevent germs from getting into the urinary tract  Do not douche or use feminine deodorants  These can change the chemical balance in your vagina  You may also be given vaginal estrogen medicine  This medicine helps prevent recurrent UTIs in women who have gone through menopause or are in maddi-menopause      Follow up with your healthcare provider as directed:  Write down your questions so you remember to ask them during your visits  © Copyright [address] Information is for End User's use only and may not be sold, redistributed or otherwise used for commercial purposes  All illustrations and images included in CareNotes® are the copyrighted property of [employer]  or [employer]  The above information is an  only  It is not intended as medical advice for individual conditions or treatments  Talk to your doctor, nurse or pharmacist before following any medical regimen to see if it is safe and effective for you  Nitrofurantoin Combination (By mouth)   Nitrofurantoin Monohydrate (opr-uwrm-thui-AN-toyn fko-ij-GEL-drate), Nitrofurantoin, Macrocrystals (ytf-tfgw-vadi-AN-toyn FOC-veh-fguh-tals)  Treats urinary tract infections caused by bacteria  This medicine is an antibiotic  Brand Name(s): Macrobid, Nitrofurantoin Avpak   There may be other brand names for this medicine  When This Medicine Should Not Be Used: You should not use this medicine if you have had an allergic reaction to nitrofurantoin or if you are in your last weeks of pregnancy (week 38 or later)  You should not use this medicine if you have severe kidney disease, if you are unable to urinate, or if you have a decreased amount of urine  Do not use this medicine if you have a history of liver disease with nitrofurantoin  How to Use This Medicine:   Capsule  · Your doctor will tell you how much medicine to use  Do not use more than directed  · It is best to take this medicine with food or milk  · Take all of the medicine in your prescription to clear up your infection, even if you feel better after the first few doses  If a dose is missed:   · Take a dose as soon as you remember  If it is almost time for your next dose, wait until then and take a regular dose  Do not take extra medicine to make up for a missed dose    How to Store and Dispose of This Medicine:   · Store the medicine in a closed container at room temperature, away from heat, moisture, and direct light  · Ask your pharmacist, doctor, or health caregiver about the best way to dispose of any outdated medicine or medicine no longer needed  · Keep all medicine out of the reach of children  Never share your medicine with anyone  Drugs and Foods to Avoid:   Ask your doctor or pharmacist before using any other medicine, including over-the-counter medicines, vitamins, and herbal products  · Make sure your doctor knows if you are also using probenecid (Benemid®) or sulfinpyrazone (Anturane®)  · It is best not to use antacids containing magnesium trisilicate (such as Foamicon® or Gaviscon®) while you are using nitrofurantoin  Warnings While Using This Medicine:   · Make sure your doctor knows if you are pregnant or breastfeeding, or if you have liver disease, heart disease, lung disease, anemia, diabetes, a mineral imbalance in the blood, or vitamin B deficiency  Make sure your doctor knows if you have a condition called [id number]  · This medicine may cause your urine to be a brown color  This is normal and will not affect how the medicine works  · This medicine can cause diarrhea  Call your doctor if the diarrhea becomes severe, does not stop, or is bloody  Do not take any medicine to stop diarrhea until you have talked to your doctor  Diarrhea can occur 2 months or more after you stop taking this medicine  · Use this medicine only to treat the infection you now have  This medicine will not work for colds, flu, or other virus infections  Possible Side Effects While Using This Medicine:   Call your doctor right away if you notice any of these side effects:  · Allergic reaction: Itching or hives, swelling in your face or hands, swelling or tingling in your mouth or throat, chest tightness, trouble breathing  · Blisters, peeling, or red skin rash    · Cough, fever, chills, weakness, shortness of breath, or chest pain   · Dark-colored urine or pale stools  · Diarrhea or loose, watery stools that may contain blood  · Nausea, vomiting, loss of appetite, or pain in your upper stomach  · Numbness, tingling, or burning pain in your hands, arms, legs, or feet  · Yellowing of your skin or the whites of your eyes  If you notice these less serious side effects, talk with your doctor:   · Dizziness, headache, or blurred vision  · Hair loss  · Mild nausea, vomiting, constipation, stomach upset, or pain  · Vaginal itching or fluids  If you notice other side effects that you think are caused by this medicine, tell your doctor  Call your doctor for medical advice about side effects  You may report side effects to FDA at 3-562-FDA-0845  © Copyright [address] Information is for End User's use only and may not be sold, redistributed or otherwise used for commercial purposes  The above information is an  only  It is not intended as medical advice for individual conditions or treatments  Talk to your doctor, nurse or pharmacist before following any medical regimen to see if it is safe and effective for you

## 2021-02-20 NOTE — ASSESSMENT & PLAN NOTE
· Presented with acute vertigo that occurred overnight causing her to have nausea and vomiting  · No neuro deficits on exam  · CT scan revealed (2/19/21): Ill-defined areas of decreased attenuation in the occipital lobes bilaterally, left side greater than right  Findings likely represent subacute infarction  Also in the differential, however less likely would be PRES (posterior reversible encephalopathy syndrome) or superior sagittal sinus thrombosis  · MRI Brain (2/19/21): No acute intracranial abnormality  Mild nonspecific frontal white matter signal abnormality which can be seen in patients with migraines  Alternatively, white matter signal abnormality may be secondary to chronic microangiopathic disease  · MRV Head (2/19/21): No evidence of dural venous sinus thrombosis  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

## 2021-02-20 NOTE — DISCHARGE SUMMARY
Discharge- Lindsay Para 1945, 66 y o  female MRN: [id number]    Unit/Bed#: -01 Encounter: [id number]    Primary Care Provider: No primary care provider on file  Date and time admitted to hospital: 2/19/2021  5:30 AM    * Vertigo  Assessment & Plan  · Presented with acute vertigo that occurred overnight causing her to have nausea and vomiting  · No neuro deficits on exam  · CT scan revealed (2/19/21): Ill-defined areas of decreased attenuation in the occipital lobes bilaterally, left side greater than right  Findings likely represent subacute infarction  Also in the differential, however less likely would be PRES (posterior reversible encephalopathy syndrome) or superior sagittal sinus thrombosis  · MRI Brain (2/19/21): No acute intracranial abnormality  Mild nonspecific frontal white matter signal abnormality which can be seen in patients with migraines  Alternatively, white matter signal abnormality may be secondary to chronic microangiopathic disease  · MRV Head (2/19/21): No evidence of dural venous sinus thrombosis  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  · Was admitted under stroke pathway  · Neurology consult   · Likely symptoms related to BPPV; no clinical evidence of Labyrinthitis, Neuronitis, or Meiniere's  · Echo (3/31/68): Systolic function was normal  Ejection fraction was estimated to be 60 %  There were no regional wall motion abnormalities  Doppler parameters were consistent with abnormal left ventricular relaxation (grade 1 diastolic dysfunction)  · No events noted on Telemetry  · PT/OT    Hyperlipidemia  Assessment & Plan  · Continue statin    Hypokalemia  Assessment & Plan  · Patient with history of hypokalemia  · Noted to be 3 4 today; will replace  Discharging Physician / Practitioner: AVEL Hanson  PCP: No primary care provider on file    Admission Date:   Admission Orders (From admission, onward)     Ordered        02/19/21 Neel 10 in Observation  Once                   Discharge Date: 02/20/21    Resolved Problems  Date Reviewed: 2/20/2021    None          Consultations During Hospital Stay:  · Neurology  · PT/OT    Procedures Performed:   · None    Significant Findings / Test Results:   · CT Head (2/19/21): Ill-defined areas of decreased attenuation in the occipital lobes bilaterally, left side greater than right  Findings likely represent subacute infarction  Also in the differential, however less likely would be PRES (posterior reversible encephalopathy syndrome) or superior sagittal sinus thrombosis  · MRI Brain (2/19/21): No acute intracranial abnormality  Mild nonspecific frontal white matter signal abnormality which can be seen in patients with migraines  Alternatively, white matter signal abnormality may be secondary to chronic microangiopathic disease  · MRV Head (2/19/21): No evidence of dural venous sinus thrombosis  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  · Chest x-ray (2/19/21): No acute cardiopulmonary disease  · Echocardiogram (2/93/63): Systolic function was normal  Ejection fraction was estimated to be 60 %  There were no regional wall motion abnormalities  Doppler parameters were consistent with abnormal left ventricular relaxation (grade 1 diastolic dysfunction)  Incidental Findings:   · None    Test Results Pending at Discharge (will require follow up): · None     Outpatient Tests Requested:  · Follow up with PCP within 1 week  Complications:  None    Reason for Admission: Vertigo    Hospital Course:     Monroe Opitz is a 66 y o  female patient who originally presented to the hospital on 2/19/2021 due to dizziness that abruptly started prior to admission with nausea/vomiting  Patient has past medical history of hyperlipidemia on statin  Neurology was consulted to rule out stroke or other causes of dizziness  She had an MRI, MRV and CT of the head    Results noted above  Echocardiogram was also obtained, results noted above  Thought to be related to BPPV  PT evaluated patient, recommending outpatient therapy with vestibular rehab  Patient reported resolution of dizziness  Vital signs stable, labs stable  Patient is stable for discharge  Please see above list of diagnoses and related plan for additional information  Condition at Discharge: good     Discharge Day Visit / Exam:     Subjective:  Patient ambulatory in the room; called daughters on the phone to translate; patient feels well, denies any more dizziness  She had a slight headache this morning but resolved  Vitals: Blood Pressure: 146/71 (02/20/21 1125)  Pulse: (!) 52 (02/20/21 1125)  Temperature: 97 8 °F (36 6 °C) (02/20/21 1125)  Temp Source: Oral (02/19/21 1900)  Respirations: 16 (02/20/21 1125)  Height: 5' 2" (157 5 cm) (02/19/21 0538)  Weight - Scale: 66 8 kg (147 lb 4 3 oz) (02/19/21 0538)  SpO2: 95 % (02/20/21 1125)     Exam:   Physical Exam  Constitutional:       General: She is not in acute distress  Appearance: She is normal weight  She is not ill-appearing  Cardiovascular:      Rate and Rhythm: Normal rate and regular rhythm  Pulses: Normal pulses  Heart sounds: Normal heart sounds  Pulmonary:      Effort: Pulmonary effort is normal       Breath sounds: Normal breath sounds  Abdominal:      General: Bowel sounds are normal       Palpations: Abdomen is soft  Musculoskeletal: Normal range of motion  General: No swelling  Skin:     General: Skin is warm and dry  Capillary Refill: Capillary refill takes less than 2 seconds  Neurological:      Mental Status: She is alert and oriented to person, place, and time  Psychiatric:         Mood and Affect: Mood normal        Discussion with Family: Hector Cisneros    Discharge instructions/Information to patient and family:   See after visit summary for information provided to patient and family  Provisions for Follow-Up Care:  See after visit summary for information related to follow-up care and any pertinent home health orders  Disposition:     Home    Planned Readmission: No     Discharge Statement:  I spent 35 minutes discharging the patient  This time was spent on the day of discharge  I had direct contact with the patient on the day of discharge  Greater than 50% of the total time was spent examining patient, answering all patient questions, arranging and discussing plan of care with patient as well as directly providing post-discharge instructions  Additional time then spent on discharge activities  Discharge Medications:  See after visit summary for reconciled discharge medications provided to patient and family        ** Please Note: This note has been constructed using a voice recognition system **

## 2021-02-20 NOTE — PHYSICAL THERAPY NOTE
Physical Therapy Evaluation     Patient's Name: [place]    Admitting Diagnosis  Dizziness [R42]  Vertigo [R42]  Vomiting [R11 10]  UTI (urinary tract infection) [N39 0]  Nausea and vomiting [R11 2]  Headache [R51 9]    Problem List  Patient Active Problem List   Diagnosis    Pyelonephritis    Hypokalemia    Hypomagnesemia    Nausea, vomiting and diarrhea    Hyperlipidemia    Hyperglycemia    Sepsis ([address] )    Vertigo       Past Medical History  Past Medical History:   Diagnosis Date    Hyperlipidemia     Renal disorder        Past Surgical History  Past Surgical History:   Procedure Laterality Date    KIDNEY SURGERY      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 ago           02/20/21 0830   PT Last Visit   PT Visit Date 02/20/21   Note Type   Note type Evaluation   Pain Assessment   Pain Assessment Tool 0-10   Pain Score No Pain   Home Living   Type of Home Apartment  (in [facility])   Home Layout Two level;Stairs to enter with rails; Performs ADLs on one level  (2nd floor c flight of steps)   Home Equipment Other (Comment)  (no AD used at baseline)   Additional Comments set up here in [place] is [facility], 1st floor set up   Prior Function   Level of [place] Independent with ADLs and functional mobility   Lives With Daughter  (Glory in [facility])   Sudha   IADLs Needs assistance  (dtrs assist at baseline per pt report)   Falls in the last 6 months 0   Comments pt here visiting family in [place], typically resides in [facility]   Restrictions/Precautions   Weight Bearing Precautions Per Order No   Braces or Orthoses   (none reported)   Other Precautions Bed Alarm; Chair Alarm; Fall Risk;Telemetry  (chair alarm activated post session)   General   Additional Pertinent History  Intent [place] [id number] (pt Welsh speaking)   Family/Caregiver Present No   Cognition   Overall Cognitive Status WFL   Arousal/Participation Alert   Orientation Level Oriented X4   Memory Within functional limits   Following Commands Follows all commands and directions without difficulty   Comments pt agreeable to PT Evaluation   RUE Assessment   RUE Assessment WFL  (symmetrical)   LUE Assessment   LUE Assessment WFL  (symmetrical)   RLE Assessment   RLE Assessment WFL  (symmetrical  quad 4/5)   LLE Assessment   LLE Assessment WFL  (symmetrical  quad 4/5)   Coordination   Movements are Fluid and Coordinated 1   Sensation WFL   Light Touch   RLE Light Touch Grossly intact   LLE Light Touch Grossly intact   Bed Mobility   Supine to Sit 6  Modified independent   Sit to Supine 6  Modified independent   Transfers   Sit to Stand 5  Supervision   Additional items Verbal cues   Stand to Sit 5  Supervision   Additional items Verbal cues   Toilet transfer 5  Supervision   Additional items Verbal cues;Standard toilet   Additional Comments pt reports dizziness has improved, only feels it mildly   Ambulation/Elevation   Gait pattern Inconsistent ginger  (mild lateral sway  no LOB)   Gait Assistance 5  Supervision   Additional items Assist x 1;Verbal cues   Assistive Device None   Distance 50'   Balance   Static Sitting Good   Dynamic Sitting Good   Static Standing Fair +   Dynamic Standing Fair +   Ambulatory Fair   Endurance Deficit   Endurance Deficit No   Activity Tolerance   Activity Tolerance Patient tolerated treatment well   Nurse Made Aware FELISA Gillespie verbalized pt appropriate to see, made aware of session outcome/recs   Assessment   Prognosis Good   Problem List Decreased strength; Impaired balance;Decreased mobility   Assessment Pt is 66 y o  female seen for PT evaluation on 2/20/2021 s/p admit to [facility] on 2/19/2021 w/ Vertigo  PT consulted to assess pt's functional mobility and d/c needs  Order placed for PT eval and tx, w/ up and OOB as tolerated order  Performed at least 2 patient identifiers during session: Name and wristband   Comorbidities affecting pt's physical performance at time of assessment include:  has a past medical history of Hyperlipidemia and Renal disorder  PTA, pt was independent w/ all functional mobility w/ no AD/DME, ambulates unrestricted distances and all terrain, has flight of SAMMY and lives w/ daughter in 2nd level apartment in [place] falls pt here in [place] visiting family  Personal factors affecting pt at time of IE include: stairs to enter home and preferred language not [place] (language barrier)  Please find objective findings from PT assessment regarding body systems outlined above with impairments and limitations including weakness, impaired balance, gait deviations and fall risk, as well as mobility assessment  The following objective measures performed on IE also reveal limitations: Barthel Index: 70/100 and Modified [place]: 1 (no significant disability)  Pt's clinical presentation is currently unstable/unpredictable seen in pt's presentation of abnormal lab value(s), on telemetry monitoring and ongoing medical assessment  Pt to benefit from continued PT tx to address deficits as defined above and maximize level of functional independent mobility and consistency  From PT/mobility standpoint, recommendation at time of d/c would be OP PT c vestibular emphasis & family support pending progress in order to facilitate return to PLOF  Pt denies any concerns to return home     Barriers to Discharge None   Goals   Patient Goals to return home   STG Expiration Date 03/02/21   Short Term Goal #1 In 7-10 days: Increase bilateral LE strength 1/2 grade to facilitate independent mobility, Perform all transfers independently to improve independence, Ambulate > 150 ft  without AD independently w/o LOB and w/ normalized gait pattern 100% of the time, Navigate 13 stairs modified independent with unilateral handrail to facilitate return to previous living environment, Increase all balance 1/2 grade to decrease risk for falls, PT provider will perform functional balance assessment to determine fall risk and Ascertain vestibular deficits/needs   PT Treatment Day 0   Plan   Treatment/Interventions Functional transfer training;LE strengthening/ROM; Elevations; Therapeutic exercise;Patient/family training;Gait training;Spoke to nursing  (higher level balance training)   PT Frequency 2-3x/wk   Recommendation   PT Discharge Recommendation Home with skilled therapy; Return to previous environment with social support  (OPPT c vestibular emphasis)   Equipment Recommended   (none anticipated)   AM-PAC Basic Mobility Inpatient   Turning in Bed Without Bedrails 4   Lying on Back to Sitting on Edge of Flat Bed 4   Moving Bed to Chair 4   Standing Up From Chair 3   Walk in Room 3   Climb 3-5 Stairs 3   Basic Mobility Inpatient Raw Score 21   Basic Mobility Standardized Score 45 55   Modified Jill Scale   Modified Jill Scale 1   Barthel Index   Feeding 10   Bathing 5   Grooming Score 5   Dressing Score 10   Bladder Score 10   Bowels Score 10   Toilet Use Score 10   Transfers (Bed/Chair) Score 10   Mobility (Level Surface) Score 0   Stairs Score 0   Barthel Index Score 70         Stewart Patterson, PT, DPT

## 2021-02-20 NOTE — PLAN OF CARE
Problem: PHYSICAL THERAPY ADULT  Goal: Performs mobility at highest level of function for planned discharge setting  See evaluation for individualized goals  Description: Treatment/Interventions: Functional transfer training, LE strengthening/ROM, Elevations, Therapeutic exercise, Patient/family training, Gait training, Spoke to nursing(higher level balance training)  Equipment Recommended: (none anticipated)       See flowsheet documentation for full assessment, interventions and recommendations  Note: Prognosis: Good  Problem List: Decreased strength, Impaired balance, Decreased mobility  Assessment: Pt is 66 y o  female seen for PT evaluation on 2/20/2021 s/p admit to [facility] on 2/19/2021 w/ Vertigo  PT consulted to assess pt's functional mobility and d/c needs  Order placed for PT eval and tx, w/ up and OOB as tolerated order  Performed at least 2 patient identifiers during session: Name and wristband  Comorbidities affecting pt's physical performance at time of assessment include:  has a past medical history of Hyperlipidemia and Renal disorder  PTA, pt was independent w/ all functional mobility w/ no AD/DME, ambulates unrestricted distances and all terrain, has flight of [place] and lives w/ daughter in 2nd level apartment in [place] falls pt here in [place] visiting family  Personal factors affecting pt at time of IE include: stairs to enter home and preferred language not [place] (language barrier)  Please find objective findings from PT assessment regarding body systems outlined above with impairments and limitations including weakness, impaired balance, gait deviations and fall risk, as well as mobility assessment  The following objective measures performed on IE also reveal limitations: Barthel Index: 70/100 and Modified [place]: 1 (no significant disability)   Pt's clinical presentation is currently unstable/unpredictable seen in pt's presentation of abnormal lab value(s), on telemetry monitoring and ongoing medical assessment  Pt to benefit from continued PT tx to address deficits as defined above and maximize level of functional independent mobility and consistency  From PT/mobility standpoint, recommendation at time of d/c would be OP PT c vestibular emphasis & family support pending progress in order to facilitate return to PLOF  Pt denies any concerns to return home  Barriers to Discharge: None     PT Discharge Recommendation: Home with skilled therapy, Return to previous environment with social support(OPPT c vestibular emphasis)          See flowsheet documentation for full assessment

## 2021-02-21 LAB — BACTERIA UR CULT: ABNORMAL

## 2021-02-24 LAB
BACTERIA BLD CULT: NORMAL
BACTERIA BLD CULT: NORMAL